# Patient Record
Sex: FEMALE | Race: OTHER | HISPANIC OR LATINO | Employment: STUDENT | URBAN - METROPOLITAN AREA
[De-identification: names, ages, dates, MRNs, and addresses within clinical notes are randomized per-mention and may not be internally consistent; named-entity substitution may affect disease eponyms.]

---
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
## 2024-07-01 NOTE — PROGRESS NOTES
Assessment   Diagnoses and all orders for this visit:    Possible exposure to STD- will call results  -     Hepatitis C antibody; Future  -     HIV 1/2 AG/AB w Reflex [facility] for 2 yr old and above; Future  -     RPR-Syphilis Screening (Total Syphilis IGG/IGM); Future  -     Hepatitis B surface antigen  -     Chlamydia/GC amplified DNA by PCR    Pregnancy test positive  -     POCT urine HCG  Pt aware of options  Information given on [facility] per request  Reviewed pregnancy precautions, call with bleeding, pain. Reviewed PN vitamins if keeping pregnancy, call with any concerns    Vaginal yeast infection  -     POCT wet mount-Positive for Hebrew        Possible STD exposure    Yeast infection, plans to use Monistat OTC    Plan     Will call pt with results  Pt to call and let us know what her plan is       Subjective    Juli Fish is a 18 y.o. female who presents for sexually transmitted disease check.she is accompanied by her mother today.  Sexual history reviewed with the patient. STI Exposure: denies knowledge of risky exposure.  She reports she just wants to be checked and desires STD blood work and culture done.  Previous history of STI chlamydia.  Last culture for chlamydia and gonorrhea was done 2022 and was negative history of positive chlamydia 2022 ,  current symptoms thick dsch that is new  LMP 24, menses is late, nauseous x 2 week.  She reports her menses are never late.  She has not been using any contraception, previously was on Depo-Provera  Contraception: none  Menstrual History:  OB History          0    Para   0    Term   0       0    AB   0    Living   0         SAB   0    IAB   0    Ectopic   0    Multiple   0    Live Births   0                  Patient's last menstrual period was 2024 (exact date).       The following portions of the patient's history were reviewed and updated as appropriate: allergies, current medications, past family history, past medical  "history, past social history, past surgical history, and problem list.    Review of Systems  Pertinent items are noted in HPI.        Objective      /74 (BP Location: Right arm, Patient Position: Sitting, Cuff Size: Adult)   Pulse 86   Resp 18   Ht 5' 3\" (1.6 m)   Wt 48.5 kg (107 lb)   LMP 05/30/2024 (Exact Date)   BMI 18.95 kg/m²     General:   alert and oriented, in no acute distress   Heart: regular rate and rhythm   Lungs:    Abdomen: soft, non-tender, without masses or organomegaly   Vulva: Bartholin's, Urethra, Briggs's normal   Vagina: normal discharge   Cervix: no cervical motion tenderness and no lesions   Uterus: normal size, non-tender   Adnexa: normal adnexa and no mass, fullness, tenderness   Lymph Nodes:  Cervical, supraclavicular, and axillary nodes normal.   Cultures: GC and Chlamydia genprobes             "

## 2024-07-02 ENCOUNTER — OFFICE VISIT (OUTPATIENT)
Dept: OBGYN CLINIC | Facility: CLINIC | Age: 19
End: 2024-07-02

## 2024-07-02 VITALS
HEART RATE: 86 BPM | DIASTOLIC BLOOD PRESSURE: 74 MMHG | BODY MASS INDEX: 18.96 KG/M2 | RESPIRATION RATE: 18 BRPM | SYSTOLIC BLOOD PRESSURE: 121 MMHG | HEIGHT: 63 IN | WEIGHT: 107 LBS

## 2024-07-02 DIAGNOSIS — Z32.01 PREGNANCY TEST POSITIVE: ICD-10-CM

## 2024-07-02 DIAGNOSIS — Z20.2 POSSIBLE EXPOSURE TO STD: Primary | ICD-10-CM

## 2024-07-02 DIAGNOSIS — B37.31 VAGINAL YEAST INFECTION: ICD-10-CM

## 2024-07-02 LAB
BV WHIFF TEST VAG QL: ABNORMAL
CLUE CELLS SPEC QL WET PREP: ABNORMAL
PH SMN: 4 [PH]
SL AMB POCT URINE HCG: NORMAL
SL AMB POCT WET MOUNT: ABNORMAL
T VAGINALIS VAG QL WET PREP: ABNORMAL
YEAST VAG QL WET PREP: ABNORMAL

## 2024-07-02 PROCEDURE — 87591 N.GONORRHOEAE DNA AMP PROB: CPT | Performed by: NURSE PRACTITIONER

## 2024-07-02 PROCEDURE — 99213 OFFICE O/P EST LOW 20 MIN: CPT | Performed by: NURSE PRACTITIONER

## 2024-07-02 PROCEDURE — 87210 SMEAR WET MOUNT SALINE/INK: CPT | Performed by: NURSE PRACTITIONER

## 2024-07-02 PROCEDURE — 81025 URINE PREGNANCY TEST: CPT | Performed by: NURSE PRACTITIONER

## 2024-07-02 PROCEDURE — 87491 CHLMYD TRACH DNA AMP PROBE: CPT | Performed by: NURSE PRACTITIONER

## 2024-07-05 LAB
C TRACH DNA SPEC QL NAA+PROBE: NEGATIVE
N GONORRHOEA DNA SPEC QL NAA+PROBE: NEGATIVE

## 2024-07-08 ENCOUNTER — OFFICE VISIT (OUTPATIENT)
Dept: FAMILY MEDICINE CLINIC | Facility: CLINIC | Age: 19
End: 2024-07-08

## 2024-07-08 VITALS
WEIGHT: 108.6 LBS | TEMPERATURE: 99.2 F | HEIGHT: 63 IN | BODY MASS INDEX: 19.24 KG/M2 | DIASTOLIC BLOOD PRESSURE: 62 MMHG | SYSTOLIC BLOOD PRESSURE: 100 MMHG

## 2024-07-08 DIAGNOSIS — Z32.01 PREGNANCY TEST POSITIVE: Primary | ICD-10-CM

## 2024-07-08 DIAGNOSIS — F32.9 MAJOR DEPRESSIVE DISORDER, REMISSION STATUS UNSPECIFIED, UNSPECIFIED WHETHER RECURRENT: ICD-10-CM

## 2024-07-08 RX ORDER — ESCITALOPRAM OXALATE 10 MG/1
10 TABLET ORAL DAILY
Qty: 30 TABLET | Refills: 0 | Status: SHIPPED | OUTPATIENT
Start: 2024-07-08

## 2024-07-08 NOTE — PROGRESS NOTES
Ambulatory Visit  Name: Juli Fish      : 2005      MRN: [id number]  Encounter Provider: Earlene Leong MD  Encounter Date: 2024   Encounter department: [facility]    Assessment & Plan   1. Pregnancy test positive  Assessment & Plan:  POCT pregnancy positive  LMP unknown exact date; patient reported last week of May  Likely to be roughly 5-6 weeks gestation  Unwanted pregnancy; Options were discussed with patient  Patient has been in contact with [facility]; she would like a form about elective termination to be filled out by PCP, however, did not bring form    Advised patient to send form to our office  Will review form once received and sign if deemed appropriat    2. Major depressive disorder, remission status unspecified, unspecified whether recurrent  Assessment & Plan:  History of MDD in remission and suicidal ideation with attempt in May 2021  Has never been on medication or treatment  Has not been seen by psychiatry or any other behavioral professional due to wait list  Mood has been more anxious and sad for the past week, especially since discovering she is pregnant  PHQ-9 score was 11 today.   No SI, HI today    Discussed behavioral support and resources  Will trial patient on Lexapro 10 mg; recommended to start on 5 mg for 1-2 weeks to attempt to diminish GI side effects with goal to transition to 10 mg forward  Discussed expectations of SSRI effectiveness in 4-6 weeks after starting medication  Return to office in 4-6 weeks to assess symptoms      Orders:  -     escitalopram (Lexapro) 10 mg tablet; Take 1 tablet (10 mg total) by mouth daily       History of Present Illness     17 yo female presents today to the office confirm pregnancy. Patient was seen earlier this month at [facility] where POCT pregnancy was positive.  Patient reached out to [facility] to discuss options.  Patient has been having morning sickness for the past  "month.  She reports that she has been feeling somewhat \"weird\" since learning about her pregnancy.  She feels anxious as well as sad.  Patient reports that she is not ready to be pregnant.  She also reports that she discussed termination options with [facility].  She states that they recommended patient to bring form to PCP to fill out termination papers.  Patient denies SI, HI, vaginal bleeding, abdominal pain.        Review of Systems   Constitutional:  Negative for fatigue.   Respiratory:  Negative for shortness of breath.    Cardiovascular:  Negative for chest pain.   Gastrointestinal:  Positive for nausea and vomiting. Negative for constipation and diarrhea.   Genitourinary:  Negative for menstrual problem and pelvic pain.   Psychiatric/Behavioral:  Negative for behavioral problems, confusion, decreased concentration, dysphoric mood, hallucinations, self-injury and suicidal ideas. The patient is nervous/anxious. The patient is not hyperactive.        Objective     /62 (BP Location: Left arm, Patient Position: Sitting, Cuff Size: Standard)   Temp 99.2 °F (37.3 °C) (Tympanic)   Ht 5' 3\" (1.6 m)   Wt 49.3 kg (108 lb 9.6 oz)   LMP 05/30/2024 (Exact Date)   BMI 19.24 kg/m²     Physical Exam  Constitutional:       Appearance: Normal appearance.   Cardiovascular:      Rate and Rhythm: Normal rate and regular rhythm.      Pulses: Normal pulses.      Heart sounds: Normal heart sounds.   Pulmonary:      Effort: Pulmonary effort is normal.      Breath sounds: Normal breath sounds.   Abdominal:      Palpations: Abdomen is soft.   Neurological:      Mental Status: She is alert.   Psychiatric:         Attention and Perception: Attention normal.         Mood and Affect: Mood and affect normal.         Speech: Speech normal.         Behavior: Behavior normal. Behavior is cooperative.         Thought Content: Thought content normal.         Cognition and Memory: Cognition normal.         Judgment: Judgment normal. "       Administrative Statements

## 2024-07-09 LAB — SL AMB POCT URINE HCG: POSITIVE

## 2024-07-09 NOTE — ASSESSMENT & PLAN NOTE
History of MDD in remission and suicidal ideation with attempt in May 2021  Has never been on medication or treatment  Has not been seen by psychiatry or any other behavioral professional due to wait list  Mood has been more anxious and sad for the past week, especially since discovering she is pregnant  PHQ-9 score was 11 today.   No SI, HI today    Discussed behavioral support and resources  Will trial patient on Lexapro 10 mg; recommended to start on 5 mg for 1-2 weeks to attempt to diminish GI side effects with goal to transition to 10 mg forward  Discussed expectations of SSRI effectiveness in 4-6 weeks after starting medication  Return to office in 4-6 weeks to assess symptoms

## 2024-07-09 NOTE — ASSESSMENT & PLAN NOTE
POCT pregnancy positive  LMP unknown exact date; patient reported last week of May  Likely to be roughly 5-6 weeks gestation  Unwanted pregnancy; Options were discussed with patient  Patient has been in contact with [facility]; she would like a form about elective termination to be filled out by PCP, however, did not bring form    Advised patient to send form to our office  Will review form once received and sign if deemed appropriat

## 2024-07-10 ENCOUNTER — TELEPHONE (OUTPATIENT)
Dept: OBGYN CLINIC | Facility: CLINIC | Age: 19
End: 2024-07-10

## 2024-07-10 NOTE — TELEPHONE ENCOUNTER
----- Message from OLGA Ariza sent at 7/10/2024  9:40 AM EDT -----  Please inform pt that her culture for chlamydia and gonorrhea were negative.  Thank You!

## 2024-07-11 NOTE — TELEPHONE ENCOUNTER
Attempted to call, left a message referring patient to my chart to review recent test results. Office number provided in message in case of questions or concerns

## 2024-07-24 ENCOUNTER — TELEPHONE (OUTPATIENT)
Dept: FAMILY MEDICINE CLINIC | Facility: CLINIC | Age: 19
End: 2024-07-24

## 2024-07-24 NOTE — TELEPHONE ENCOUNTER
Encounter for forms      Patient requires a form to be completed.  Patient is aware of 7-10 day turn around time.    Please refer to the following information:       Type of Form:  Physicians Certification for  an      Date of Visit (if applicable):     Doctor: Lolly     Expected date:asap     How patient would like to receive form: Picked up     Fax number:     Patient phone number:       Copy scanned to encounter. Copy provided to patient. Original in (X) team folder to be completed.

## 2024-08-12 ENCOUNTER — TELEPHONE (OUTPATIENT)
Age: 19
End: 2024-08-12

## 2024-08-12 ENCOUNTER — OFFICE VISIT (OUTPATIENT)
Dept: FAMILY MEDICINE CLINIC | Facility: CLINIC | Age: 19
End: 2024-08-12

## 2024-08-12 VITALS
TEMPERATURE: 98.7 F | OXYGEN SATURATION: 94 % | DIASTOLIC BLOOD PRESSURE: 57 MMHG | WEIGHT: 107 LBS | SYSTOLIC BLOOD PRESSURE: 130 MMHG | HEART RATE: 103 BPM | BODY MASS INDEX: 18.95 KG/M2

## 2024-08-12 DIAGNOSIS — Z3A.15 15 WEEKS GESTATION OF PREGNANCY: Primary | ICD-10-CM

## 2024-08-12 RX ORDER — PNV NO.95/FERROUS FUM/FOLIC AC 28MG-0.8MG
1 TABLET ORAL DAILY
Qty: 90 TABLET | Refills: 1 | Status: SHIPPED | OUTPATIENT
Start: 2024-08-12

## 2024-08-12 NOTE — TELEPHONE ENCOUNTER
Mariana from [facility] called looking to schedule an appt for pt. Referral was placed by office for what appears to be Nuchal, however, pt is currently 15 wks and 2 days preg (LMP 4/27/2024). Mariana confirmed pt has not yet had a D&V US, and could not confirm if there are 1 or multiple fetuses.    Did communicate plan to reach out to office to ensure scheduling correctly, as I believe this would need to go through Dr. Neri prior to Scheduling.    Please then reach out to pt to assist with scheduling. Thank you.

## 2024-08-13 ENCOUNTER — APPOINTMENT (OUTPATIENT)
Dept: LAB | Facility: CLINIC | Age: 19
End: 2024-08-13
Payer: COMMERCIAL

## 2024-08-13 DIAGNOSIS — Z20.2 POSSIBLE EXPOSURE TO STD: ICD-10-CM

## 2024-08-13 DIAGNOSIS — Z3A.15 15 WEEKS GESTATION OF PREGNANCY: ICD-10-CM

## 2024-08-13 LAB
ABO GROUP BLD: NORMAL
BACTERIA UR QL AUTO: ABNORMAL /HPF
BASOPHILS # BLD AUTO: 0.04 THOUSANDS/ÂΜL (ref 0–0.1)
BASOPHILS NFR BLD AUTO: 1 % (ref 0–1)
BILIRUB UR QL STRIP: NEGATIVE
BLD GP AB SCN SERPL QL: NEGATIVE
CLARITY UR: ABNORMAL
COLOR UR: YELLOW
EOSINOPHIL # BLD AUTO: 0.1 THOUSAND/ÂΜL (ref 0–0.61)
EOSINOPHIL NFR BLD AUTO: 2 % (ref 0–6)
ERYTHROCYTE [DISTWIDTH] IN BLOOD BY AUTOMATED COUNT: 13.2 % (ref 11.6–15.1)
GLUCOSE UR STRIP-MCNC: NEGATIVE MG/DL
HCT VFR BLD AUTO: 34.8 % (ref 34.8–46.1)
HGB BLD-MCNC: 11.5 G/DL (ref 11.5–15.4)
HGB UR QL STRIP.AUTO: NEGATIVE
IMM GRANULOCYTES # BLD AUTO: 0.1 THOUSAND/UL (ref 0–0.2)
IMM GRANULOCYTES NFR BLD AUTO: 2 % (ref 0–2)
KETONES UR STRIP-MCNC: NEGATIVE MG/DL
LEUKOCYTE ESTERASE UR QL STRIP: NEGATIVE
LYMPHOCYTES # BLD AUTO: 1.39 THOUSANDS/ÂΜL (ref 0.6–4.47)
LYMPHOCYTES NFR BLD AUTO: 22 % (ref 14–44)
MCH RBC QN AUTO: 28.2 PG (ref 26.8–34.3)
MCHC RBC AUTO-ENTMCNC: 33 G/DL (ref 31.4–37.4)
MCV RBC AUTO: 85 FL (ref 82–98)
MONOCYTES # BLD AUTO: 0.53 THOUSAND/ÂΜL (ref 0.17–1.22)
MONOCYTES NFR BLD AUTO: 8 % (ref 4–12)
MUCOUS THREADS UR QL AUTO: ABNORMAL
NEUTROPHILS # BLD AUTO: 4.12 THOUSANDS/ÂΜL (ref 1.85–7.62)
NEUTS SEG NFR BLD AUTO: 65 % (ref 43–75)
NITRITE UR QL STRIP: NEGATIVE
NON-SQ EPI CELLS URNS QL MICRO: ABNORMAL /HPF
NRBC BLD AUTO-RTO: 0 /100 WBCS
PH UR STRIP.AUTO: 6.5 [PH]
PLATELET # BLD AUTO: 186 THOUSANDS/UL (ref 149–390)
PMV BLD AUTO: 11.5 FL (ref 8.9–12.7)
PROT UR STRIP-MCNC: ABNORMAL MG/DL
RBC # BLD AUTO: 4.08 MILLION/UL (ref 3.81–5.12)
RBC #/AREA URNS AUTO: ABNORMAL /HPF
RH BLD: POSITIVE
RUBV IGG SERPL IA-ACNC: 29.3 IU/ML
SP GR UR STRIP.AUTO: 1.02 (ref 1–1.03)
SPECIMEN EXPIRATION DATE: NORMAL
UROBILINOGEN UR STRIP-ACNC: <2 MG/DL
WBC # BLD AUTO: 6.28 THOUSAND/UL (ref 4.31–10.16)
WBC #/AREA URNS AUTO: ABNORMAL /HPF

## 2024-08-13 PROCEDURE — 86780 TREPONEMA PALLIDUM: CPT

## 2024-08-13 PROCEDURE — 86900 BLOOD TYPING SEROLOGIC ABO: CPT

## 2024-08-13 PROCEDURE — 36415 COLL VENOUS BLD VENIPUNCTURE: CPT

## 2024-08-13 PROCEDURE — 86803 HEPATITIS C AB TEST: CPT

## 2024-08-13 PROCEDURE — 87086 URINE CULTURE/COLONY COUNT: CPT

## 2024-08-13 PROCEDURE — 87389 HIV-1 AG W/HIV-1&-2 AB AG IA: CPT

## 2024-08-13 PROCEDURE — 86901 BLOOD TYPING SEROLOGIC RH(D): CPT

## 2024-08-13 PROCEDURE — 86706 HEP B SURFACE ANTIBODY: CPT

## 2024-08-13 PROCEDURE — 85025 COMPLETE CBC W/AUTO DIFF WBC: CPT

## 2024-08-13 PROCEDURE — 86762 RUBELLA ANTIBODY: CPT

## 2024-08-13 PROCEDURE — 86850 RBC ANTIBODY SCREEN: CPT

## 2024-08-13 PROCEDURE — 81001 URINALYSIS AUTO W/SCOPE: CPT

## 2024-08-13 NOTE — PROGRESS NOTES
Ambulatory Visit  Name: Juli Fish      : 2005      MRN: [id number]  Encounter Provider: Sanna Ambrocio DO  Encounter Date: 2024   Encounter department: [facility]    Assessment & Plan   1. 15 weeks gestation of pregnancy  Assessment & Plan:  Patient presenting to the office to set up prenatal care    - 15 weeks 2 days gestation estimated by LMP  - Patient reports feeling overall no vaginal discharge, vaginal bleeding, abdominal pain.  Reports bloating and some low back pain.    Plan:  Initial prenatal labs ordered  Referral to MFM for dating ultrasound and counseling on genetic testing  Start prenatal vitamin  Provided counseling and informational handout appropriate for gestational age  Plan to follow-up for formal prenatal visit  -Office attempted to schedule MFM appointment today but was unable to schedule at this time.  MF practice to reach out to patient and/or office to confirm appointment once scheduled    Orders:  -     Prenatal Vit-Fe Fumarate-FA (prenatal vitamin) 28-0.8 mg; Take 1 tablet by mouth in the morning  -     Ambulatory Referral to Maternal Fetal Medicine; Future; Expected date: 2024  -     HIV 1/2 AG/AB w Reflex [facility] for 2 yr old and above; Future  -     Hepatitis C antibody; Future  -     UA (URINE) with reflex to Scope; Future  -     Urine culture; Future  -     Hepatitis B surface antigen; Future  -     CBC and differential; Future  -     Rubella antibody, IgG; Future  -     Type and screen; Future  -     RPR-Syphilis Screening (Total Syphilis IGG/IGM); Future  -     Hepatitis B surface antibody; Future  -     Anemia Panel w/Reflex, OB; Future       History of Present Illness     HPI  18-year-old  female presenting to the office to establish prenatal care.  LMP on 2024.  Beta-hCG positive on 2024.  Patient expresses that she was initially uncertain about how to proceed with pregnancy, but presents to the office  today to establish prenatal care.  Support system includes her significant other, and her mother. She currently takes no medications, but was prescribed Lexapro recently which she trialed for 2 days.  She felt as though the medication was not necessary stopped taking it.  She has not been taking any vitamins or supplements.  She does report that uses marijuana, but has been cutting back finding out she was pregnant.  She notes occasional nausea, which is improving.  She denies any recent vomiting, diarrhea, or constipation.  No dietary restrictions noted.  She just denies any chest pain, palpitations, or shortness of breath.  Also denies any burning with urination, blood in her urine, or urinary frequency.      Review of Systems   Constitutional:  Positive for appetite change (increased). Negative for activity change, fatigue and fever.   Respiratory:  Negative for chest tightness and shortness of breath.    Cardiovascular:  Negative for chest pain and leg swelling.   Gastrointestinal:  Positive for abdominal distention and nausea. Negative for abdominal pain, constipation, diarrhea and vomiting.   Genitourinary:  Negative for dysuria, frequency, hematuria, vaginal bleeding, vaginal discharge and vaginal pain.   Musculoskeletal:  Positive for back pain.   Neurological:  Negative for dizziness, syncope and headaches.     Medical History Reviewed by provider this encounter:  Tobacco  Allergies  Meds  Problems  Med Hx  Surg Hx  Fam Hx       Objective     /57 (BP Location: Left arm, Patient Position: Sitting, Cuff Size: Standard)   Pulse 103   Temp 98.7 °F (37.1 °C)   Wt 48.5 kg (107 lb)   SpO2 94%   BMI 18.95 kg/m²     Physical Exam  Vitals reviewed.   Constitutional:       General: She is not in acute distress.  HENT:      Head: Normocephalic and atraumatic.      Mouth/Throat:      Mouth: Mucous membranes are moist.      Pharynx: Oropharynx is clear.   Eyes:      Conjunctiva/sclera: Conjunctivae  normal.   Cardiovascular:      Rate and Rhythm: Normal rate and regular rhythm.      Pulses: Normal pulses.      Heart sounds: Normal heart sounds. No murmur heard.  Pulmonary:      Effort: Pulmonary effort is normal. No respiratory distress.      Breath sounds: Normal breath sounds. No wheezing or rhonchi.   Abdominal:      General: Bowel sounds are normal. There is no distension.      Palpations: Abdomen is soft.      Tenderness: There is no abdominal tenderness. There is no guarding or rebound.   Musculoskeletal:      Right lower leg: No edema.      Left lower leg: No edema.   Skin:     General: Skin is warm and dry.      Capillary Refill: Capillary refill takes less than 2 seconds.   Neurological:      Mental Status: She is alert.   Psychiatric:         Mood and Affect: Mood normal.         Behavior: Behavior normal.

## 2024-08-13 NOTE — ASSESSMENT & PLAN NOTE
Patient presenting to the office to set up prenatal care    - 15 weeks 2 days gestation estimated by LMP  - Patient reports feeling overall no vaginal discharge, vaginal bleeding, abdominal pain.  Reports bloating and some low back pain.    Plan:  Initial prenatal labs ordered  Referral to MFM for dating ultrasound and counseling on genetic testing  Start prenatal vitamin  Provided counseling and informational handout appropriate for gestational age  Plan to follow-up for formal prenatal visit  -Office attempted to schedule MFM appointment today but was unable to schedule at this time.  MFM practice to reach out to patient and/or office to confirm appointment once scheduled

## 2024-08-14 LAB
HBV SURFACE AB SER-ACNC: 27 MIU/ML
HBV SURFACE AG SER QL: NORMAL
HCV AB SER QL: NORMAL
HIV 1+2 AB+HIV1 P24 AG SERPL QL IA: NORMAL
HIV 2 AB SERPL QL IA: NORMAL
HIV1 AB SERPL QL IA: NORMAL
HIV1 P24 AG SERPL QL IA: NORMAL
TREPONEMA PALLIDUM IGG+IGM AB [PRESENCE] IN SERUM OR PLASMA BY IMMUNOASSAY: NORMAL

## 2024-08-15 LAB — BACTERIA UR CULT: NORMAL

## 2024-08-19 ENCOUNTER — TELEPHONE (OUTPATIENT)
Dept: OTHER | Facility: HOSPITAL | Age: 19
End: 2024-08-19

## 2024-08-19 ENCOUNTER — TELEPHONE (OUTPATIENT)
Dept: OBGYN CLINIC | Facility: CLINIC | Age: 19
End: 2024-08-19

## 2024-08-19 DIAGNOSIS — R82.71 BACTERIA IN URINE: Primary | ICD-10-CM

## 2024-08-19 RX ORDER — CEPHALEXIN 500 MG/1
500 CAPSULE ORAL EVERY 12 HOURS SCHEDULED
Qty: 10 CAPSULE | Refills: 0 | Status: SHIPPED | OUTPATIENT
Start: 2024-08-19 | End: 2024-08-24

## 2024-08-19 NOTE — TELEPHONE ENCOUNTER
----- Message from OLGA Ariza sent at 8/16/2024  1:31 PM EDT -----  Please inform pt that her tests for RPR, Hep B sag, Hep C and HIV were negative.  Thanks

## 2024-08-19 NOTE — TELEPHONE ENCOUNTER
"Called patient to discuss lab results, primarily UA. The UA did not appear to be a truly clean catch and was showing possible contamination vs UTI. A 5-day course of keflex was sent to the patients pharmacy.     Patient was not available at the time of the call and \"call could not be completed\"  "

## 2024-08-19 NOTE — TELEPHONE ENCOUNTER
Called and spoke to patient, informed her of the test results. Patient verbalized understanding, no questions or concerns,.

## 2024-08-22 ENCOUNTER — ROUTINE PRENATAL (OUTPATIENT)
Age: 19
End: 2024-08-22
Payer: COMMERCIAL

## 2024-08-22 VITALS
HEIGHT: 63 IN | WEIGHT: 110.8 LBS | HEART RATE: 96 BPM | DIASTOLIC BLOOD PRESSURE: 60 MMHG | SYSTOLIC BLOOD PRESSURE: 114 MMHG | BODY MASS INDEX: 19.63 KG/M2

## 2024-08-22 DIAGNOSIS — Z3A.15 15 WEEKS GESTATION OF PREGNANCY: ICD-10-CM

## 2024-08-22 DIAGNOSIS — Z36.87 ENCOUNTER FOR ANTENATAL SCREENING FOR UNCERTAIN DATES: ICD-10-CM

## 2024-08-22 DIAGNOSIS — Z36.89 ENCOUNTER FOR ULTRASOUND TO CHECK FETAL GROWTH: ICD-10-CM

## 2024-08-22 DIAGNOSIS — Z36.3 ENCOUNTER FOR ANTENATAL SCREENING FOR MALFORMATION: ICD-10-CM

## 2024-08-22 DIAGNOSIS — Z36.0 ENCOUNTER FOR ANTENATAL SCREENING FOR CHROMOSOMAL ANOMALIES: Primary | ICD-10-CM

## 2024-08-22 DIAGNOSIS — Z33.1 PREGNANT STATE, INCIDENTAL: ICD-10-CM

## 2024-08-22 PROCEDURE — 76805 OB US >/= 14 WKS SNGL FETUS: CPT | Performed by: OBSTETRICS & GYNECOLOGY

## 2024-08-22 PROCEDURE — 36415 COLL VENOUS BLD VENIPUNCTURE: CPT | Performed by: OBSTETRICS & GYNECOLOGY

## 2024-08-22 PROCEDURE — 99243 OFF/OP CNSLTJ NEW/EST LOW 30: CPT | Performed by: OBSTETRICS & GYNECOLOGY

## 2024-08-22 NOTE — LETTER
"2024    Sanna Ambrocio, DO  [address]  [address]  [place] [address]    Patient: Juli Fish   YOB: 2005   Date of Visit: 2024   Gestational age 16w4d   Nature of this communication: Routine though please note patient does not have any future OB prenatal appoitnments scheduled at this point       Dear Dr Ambrocio,    This patient was seen recently in our  office.  The content of my evaluation today is in the ultrasound report under \"OB Procedures\" tab.     Please don't hesitate to contact our office with any concerns or questions.     Sincerely,      Peggy Black MD  Attending Physician, Maternal-Fetal Medicine  [facility]      "

## 2024-08-22 NOTE — PROGRESS NOTES
"[facility]: Juli Fish was seen today for anatomic survey ultrasound.  See ultrasound report under \"OB Procedures\" tab.   Please don't hesitate to contact our office with any concerns or questions.  -Peggy Black MD    "

## 2024-08-22 NOTE — PROGRESS NOTES
Patient chose to have [facility] [id number] Non-Invasive Prenatal Screen [id number] PLUS w/ SCA, WITH fetal sex (per pt request).  Patient choose to be billed through insurance.     Patient given brochure and is aware [facility] will contact patient's insurance and coordinate coverage.  Provided [facility] contact information. General inquiries [phone], Cost estimates [phone] and [facility] Billing [phone]. Website [email or web address].     Blood collection tubes labeled with patient identifiers (name, medical record number, and date of birth).     Filled out [facility] order form. Patient chose to have blood drawn in our office at time of visit. NIPS was drawn from left arm with a butterfly needle by A Mariano COTA. .      If patient chose to have blood work drawn at a [facility] lab we requested patient notify MFM (via phone call or [employer] message) when blood collected so office can follow up on results.       Maternal Fetal Medicine will have results in approximately 5-7 business days and will call patient or notify via [employer].  Patient aware viewing lab result online will reveal fetal sex if ordered.    Patient verbalized understanding of all instructions and no questions at this time.

## 2024-08-28 LAB
CFDNA.FET/CFDNA.TOTAL SFR FETUS: NORMAL %
CITATION REF LAB TEST: NORMAL
FET 13+18+21+X+Y ANEUP PLAS.CFDNA: NEGATIVE
FET CHR 21 TS PLAS.CFDNA QL: NEGATIVE
FET CHR 21 TS PLAS.CFDNA QL: NEGATIVE
FET MS X RISK WBC.DNA+CFDNA QL: NOT DETECTED
FET SEX PLAS.CFDNA DOSAGE CFDNA: NORMAL
FET TS 13 RISK PLAS.CFDNA QL: NEGATIVE
FET X + Y ANEUP RISK PLAS.CFDNA SEQ-IMP: NOT DETECTED
GA EST FROM CONCEPTION DATE: NORMAL D
GESTATIONAL AGE > 9:: YES
LAB DIRECTOR NAME PROVIDER: NORMAL
LAB DIRECTOR NAME PROVIDER: NORMAL
LABORATORY COMMENT REPORT: NORMAL
LIMITATIONS OF THE TEST: NORMAL
NEGATIVE PREDICTIVE VALUE: NORMAL
PERFORMANCE CHARACTERISTICS: NORMAL
POSITIVE PREDICTIVE VALUE: NORMAL
REF LAB TEST METHOD: NORMAL
SL AMB NOTE:: NORMAL
TEST PERFORMANCE INFO SPEC: NORMAL

## 2024-08-29 ENCOUNTER — TELEPHONE (OUTPATIENT)
Dept: FAMILY MEDICINE CLINIC | Facility: CLINIC | Age: 19
End: 2024-08-29

## 2024-08-29 NOTE — TELEPHONE ENCOUNTER
Left message for patient to call us to set up first prenatal visit should be with Dr Lolly LEMUS for 1 hour visit.

## 2024-09-04 ENCOUNTER — TELEPHONE (OUTPATIENT)
Dept: FAMILY MEDICINE CLINIC | Facility: CLINIC | Age: 19
End: 2024-09-04

## 2024-09-05 ENCOUNTER — PATIENT OUTREACH (OUTPATIENT)
Dept: OBGYN CLINIC | Facility: CLINIC | Age: 19
End: 2024-09-05

## 2024-09-05 ENCOUNTER — INITIAL PRENATAL (OUTPATIENT)
Dept: OBGYN CLINIC | Facility: CLINIC | Age: 19
End: 2024-09-05

## 2024-09-05 VITALS
WEIGHT: 107 LBS | DIASTOLIC BLOOD PRESSURE: 78 MMHG | BODY MASS INDEX: 18.96 KG/M2 | HEART RATE: 83 BPM | SYSTOLIC BLOOD PRESSURE: 113 MMHG | HEIGHT: 63 IN

## 2024-09-05 DIAGNOSIS — Z34.92 PRENATAL CARE IN SECOND TRIMESTER: Primary | ICD-10-CM

## 2024-09-05 DIAGNOSIS — Z31.430 ENCOUNTER OF FEMALE FOR TESTING FOR GENETIC DISEASE CARRIER STATUS FOR PROCREATIVE MANAGEMENT: ICD-10-CM

## 2024-09-05 DIAGNOSIS — Z3A.18 18 WEEKS GESTATION OF PREGNANCY: ICD-10-CM

## 2024-09-05 PROCEDURE — 99211 OFF/OP EST MAY X REQ PHY/QHP: CPT

## 2024-09-05 NOTE — PROGRESS NOTES
LELA KAY was referred by Dr. Sewell to complete a PN SW assessment in the second trimester. Pt is , she is 81d8fHG, her JESUS is 2025. Pt is english speaking and is here with her partner, Amirah.    Pt and amirah report the pregnancy is unplanned. PN care was delayed because when they first found out about they pregnancy they did debate terminating. Pt and partner are feeling excited to parent. Pt and partner report support from family in regards to pregnancy. Pt currently lives at home with her mother and sister, partner lives with his parents. Partner is looking to get his own place soon. Pt does not work, partner works in a warehouse. Pt plans to apply for WIC, she has MA. Pts family or partner drive. Pt has no concerns obtaining baby supplies.     Pt reports MH hx of one suicide attempt approx 2-3 years ago. Pt attempted to overdose on pills and was inpatient at [facility]. Pt reports having opt therapy after that for a small amount of time. Pt denies any current mental health treatment, diagnosis or concerns. Pt reports that the SA was due to issues in her relationship with current FOB. Pt and FOB were in a relationship at that time and then have had 1-2 years apart. Pt and partner recently rekindled their relationship in the last few months. Pt and partner report they feel the relationship is stable at this time, report family did have hesitancies when they intially got back together but have been more supportive now that they are expecting a baby.     Pt denies any CYS, legal, or D&A concerns, LELA KAY encouraged pt to outreach as needed, will plan to f/u in three months, LELA KAY highly encouraged NFP.

## 2024-09-05 NOTE — PATIENT INSTRUCTIONS
WARNING SIGNS DURING PREGNANCY  Call our office at [phone] for any of the followin. Vaginal bleeding  2. Sharp abdominal pain that does not go away  3. Fever (more than 100.4 and is not relieved by Tylenol)  4. Persistent vomiting lasting greater than 24 hours  5. Chest pain   6. Pain or burning when you urinate  7. Severe headache that doesn't resolve with Tylenol  8. Blurred vision or seeing spots in your vision  9. Sudden swelling of your face or hands  10. Redness, swelling or pain in a leg  11. A sudden weight gain in just a few days  12. Decrease in your baby's movement (after 28 weeks or the 6th month of pregnancy)  13. A loss of watery fluid from your vagina - can be a gush, a trickle or continuous wetness  14. After 20 weeks of pregnancy, rhythmic cramping (greater than 4 per hour) or menstrual like low/pelvic pain

## 2024-09-05 NOTE — PROGRESS NOTES
"OBSTETRIC INTAKE VISIT    Juli Fish presents today for initial OB visit and intake at 18w4d. LMP - 24.PT is transferring care from [facility]. History obtained from patient and she reports it as follows:    Past Medical History:   Diagnosis Date    Anxiety     Depression     saw a therapist before    Dizziness     Headache     HEADACHES    Lyme disease     Suicide attempt (HCC) 03/10/2021    Being seen by [facility]. Diagnosed with depression. Taking Alcakak 420mg?      History reviewed. No pertinent surgical history.  OB History    Para Term  AB Living   1 0 0 0 0 0   SAB IAB Ectopic Multiple Live Births   0 0 0 0 0      # Outcome Date GA Lbr Sameer/2nd Weight Sex Type Anes PTL Lv   1 Current              Social History     Tobacco Use    Smoking status: Never     Passive exposure: Current    Smokeless tobacco: Never   Vaping Use    Vaping status: Never Used   Substance Use Topics    Alcohol use: No    Drug use: Not Currently     Types: Marijuana     Comment: stopped around 23       Current Outpatient Medications   Medication Instructions    Prenatal Vit-Fe Fumarate-FA (prenatal vitamin) 28-0.8 mg 1 tablet, Oral, Daily       No Known Allergies    Vitals: /78 (BP Location: Left arm, Patient Position: Sitting, Cuff Size: Standard)   Pulse 83   Ht 5' 3\" (1.6 m)   Wt 48.5 kg (107 lb)   LMP 2024 (Approximate)   BMI 18.95 kg/m²     Review of Systems:  Denies vaginal bleeding or leaking fluid.  Denies abdominal/pelvic pain or contractions. FHTS 154.    Plan:  1. OB labwork completed.   2. Next ultrasound is scheduled for 24.  3. Return 24 for H&P prenatal visit.  4. Referrals placed for WIC, , and [facility].  5. Given vaccines: None due.  6. Patient's depression screening was assessed with a PHQ-2 score of 0. Their PHQ-9 score was 0. Clinically patient does not have depression. No treatment is required.   in to see " patient.  7. Reviewed the following educational topics with patient:   -routine prenatal visit/ultrasound/labwork schedule   -hospital for delivery and office phone/answering service contact information   -nutritional demands of pregnancy, healthy dietary habits   -listeria, toxoplasmosis, seafood precautions   -weight gain expectations (based on pre-pregnant BMI)   -exercise, rest, and sexual activity during pregnancy   -abstinence from alcohol, tobacco, and illegal drugs   -common discomforts of pregnancy and appropriate management   -OTC medications safe to use in pregnancy   -genetic screening options   -vaccines in pregnancy   -symptoms to report to OB provider    -signs of PTL and pre-eclampsia    -vaginal bleeding/leaking of fluid    -severe n/v-unable to tolerate ANY food/fluids for more than 24 hours

## 2024-09-05 NOTE — LETTER
9/5/2024      This letter is to confirm that Juli Fish is pregnant and is under our care.  Her Estimated Date of Delivery: 2/2/25.    If you have any questions or concerns, please contact our office.  Thank you,    OLGA Cruz

## 2024-09-05 NOTE — LETTER
"    [facility] REFERRAL      Date: 9/5/2024    Patient name: Juli Fish    YOB: 2005    Estimated Date of Delivery: 2/2/25      /78 (BP Location: Left arm, Patient Position: Sitting, Cuff Size: Standard)   Pulse 83   Ht 5' 3\" (1.6 m)   Wt 48.5 kg (107 lb)   LMP 04/28/2024 (Approximate)   BMI 18.95 kg/m²         Thank you,  OLGA Cruz            [facility] locations:   1. [facility]       [address] [place] [address]       Phone: [phone]       Fax: [phone]     2. [place] [facility]       [address] [address]       Phone: [phone]       Fax: [phone]       "

## 2024-09-16 ENCOUNTER — APPOINTMENT (OUTPATIENT)
Dept: LAB | Facility: HOSPITAL | Age: 19
End: 2024-09-16
Payer: COMMERCIAL

## 2024-09-16 ENCOUNTER — APPOINTMENT (OUTPATIENT)
Dept: LAB | Facility: CLINIC | Age: 19
End: 2024-09-16
Payer: COMMERCIAL

## 2024-09-16 DIAGNOSIS — Z31.430 ENCOUNTER OF FEMALE FOR TESTING FOR GENETIC DISEASE CARRIER STATUS FOR PROCREATIVE MANAGEMENT: ICD-10-CM

## 2024-09-16 DIAGNOSIS — Z3A.18 18 WEEKS GESTATION OF PREGNANCY: ICD-10-CM

## 2024-09-16 DIAGNOSIS — Z34.92 PRENATAL CARE IN SECOND TRIMESTER: ICD-10-CM

## 2024-09-16 PROCEDURE — 36415 COLL VENOUS BLD VENIPUNCTURE: CPT

## 2024-09-17 PROBLEM — B37.31 VAGINAL YEAST INFECTION: Status: RESOLVED | Noted: 2024-07-02 | Resolved: 2024-09-17

## 2024-09-17 PROBLEM — T14.91XA SUICIDE ATTEMPT (HCC): Status: RESOLVED | Noted: 2021-03-10 | Resolved: 2024-09-17

## 2024-09-17 PROBLEM — A74.9 CHLAMYDIA: Status: RESOLVED | Noted: 2022-12-20 | Resolved: 2024-09-17

## 2024-09-18 ENCOUNTER — DOCUMENTATION (OUTPATIENT)
Age: 19
End: 2024-09-18

## 2024-09-18 NOTE — PROGRESS NOTES
Patient showed up at 405 pm (for 330 pm appt). Spoke with Dr. Simms and rescheduled patient to Wednesday 9/25/2024 at 0830 am confirmed with patient that arrival time is 0815 am as time is needed for her U/S and TV appt. Patient verbalized understanding.

## 2024-09-20 LAB
CITATION REF LAB TEST: NORMAL
CLINICAL INFO: NORMAL
ETHNIC BACKGROUND STATED: NORMAL
GENE DIS ANL CARRIER INTERP-IMP: NORMAL
GENE MUT TESTED BLD/T: NORMAL
LAB DIRECTOR NAME PROVIDER: NORMAL
REASON FOR REFERRAL (NARRATIVE): NORMAL
RECOMMENDATION PATIENT DOC-IMP: NORMAL
REF LAB TEST METHOD: NORMAL
SERVICE CMNT-IMP: NORMAL
SMN1 GENE MUT ANL BLD/T: NORMAL
SPECIMEN SOURCE: NORMAL

## 2024-09-23 ENCOUNTER — INITIAL PRENATAL (OUTPATIENT)
Dept: OBGYN CLINIC | Facility: CLINIC | Age: 19
End: 2024-09-23

## 2024-09-23 VITALS
BODY MASS INDEX: 20.94 KG/M2 | WEIGHT: 118.2 LBS | SYSTOLIC BLOOD PRESSURE: 118 MMHG | HEIGHT: 63 IN | HEART RATE: 86 BPM | DIASTOLIC BLOOD PRESSURE: 75 MMHG

## 2024-09-23 DIAGNOSIS — Z34.92 PRENATAL CARE IN SECOND TRIMESTER: Primary | ICD-10-CM

## 2024-09-23 PROBLEM — Z3A.21 21 WEEKS GESTATION OF PREGNANCY: Status: ACTIVE | Noted: 2024-08-12

## 2024-09-23 PROCEDURE — 99213 OFFICE O/P EST LOW 20 MIN: CPT | Performed by: OBSTETRICS & GYNECOLOGY

## 2024-09-23 NOTE — PROGRESS NOTES
OB/GYN  PRENATAL H&P VISIT  Juli Fish  2024  3:49 PM  Dr. Kavita Sewell MD      SUBJECTIVE  Patient is a  at 21w1d here for initial prenatal H&P.     She is currently doing well. She is currently not working. She does have a history of depression and suicidal ideation but she is not currently on medications.    She reports a history of chlamydia in  that was treated. She denies a hx of TB or close contacts with persons with TB. She has not had MRSA.     She denies a family history of inheritable conditions such as physical or intellectual disabilities, birth defects, blood disorders, heart or neural tube defects.     She denies recent travel or travel planned in the near future.     She does have a history of marijuana use but reports discontinuation upon learning of pregnancy. She denies use of ETOH.    She denies vaginal bleeding, cramping, leakage, abnormal discharge.     OB History    Para Term  AB Living   1 0 0 0 0 0   SAB IAB Ectopic Multiple Live Births   0 0 0 0 0      # Outcome Date GA Lbr Sameer/2nd Weight Sex Type Anes PTL Lv   1 Current                Review of Systems   Constitutional:  Negative for chills and fever.   Respiratory:  Negative for cough, shortness of breath and wheezing.    Cardiovascular:  Negative for chest pain.   Gastrointestinal:  Negative for abdominal pain, nausea and vomiting.   Genitourinary:  Negative for dysuria, hematuria, urgency, vaginal bleeding and vaginal discharge.   Neurological:  Negative for dizziness, weakness, light-headedness and headaches.       Past Medical History:   Diagnosis Date    Anxiety     Chlamydia 2022    Depression     saw a therapist before    Dizziness     Headache     HEADACHES    Lyme disease     Suicide attempt (HCC) 03/10/2021    Being seen by [facility]. Diagnosed with depression. Taking Alcakak 420mg?        History reviewed. No pertinent surgical history.    Social History     Socioeconomic History     Marital status: Single     Spouse name: Not on file    Number of children: Not on file    Years of education: Not on file    Highest education level: Not on file   Occupational History    Not on file   Tobacco Use    Smoking status: Never     Passive exposure: Current    Smokeless tobacco: Never   Vaping Use    Vaping status: Never Used   Substance and Sexual Activity    Alcohol use: No    Drug use: Not Currently     Types: Marijuana     Comment: stopped around 8/22/24    Sexual activity: Yes     Partners: Male     Birth control/protection: None     Comment: [phone] is chava's personal cellular number   Other Topics Concern    Not on file   Social History Narrative    Patient gives permission to call mom's cellular number.     Social Determinants of Health     Financial Resource Strain: Low Risk  (9/23/2024)    Overall Financial Resource Strain (CARDIA)     Difficulty of Paying Living Expenses: Not hard at all   Food Insecurity: No Food Insecurity (9/23/2024)    Hunger Vital Sign     Worried About Running Out of Food in the Last Year: Never true     Ran Out of Food in the Last Year: Never true   Recent Concern: Food Insecurity - Food Insecurity Present (7/1/2024)    Hunger Vital Sign     Worried About Running Out of Food in the Last Year: Sometimes true     Ran Out of Food in the Last Year: Sometimes true   Transportation Needs: No Transportation Needs (9/23/2024)    PRAPARE - Transportation     Lack of Transportation (Medical): No     Lack of Transportation (Non-Medical): No   Recent Concern: Transportation Needs - Unmet Transportation Needs (7/1/2024)    PRAPARE - Transportation     Lack of Transportation (Medical): Yes     Lack of Transportation (Non-Medical): Yes   Physical Activity: Not on file   Stress: Not on file   Social Connections: Not on file   Intimate Partner Violence: Not on file   Housing Stability: Low Risk  (9/23/2024)    Housing Stability Vital Sign     Unable to Pay for Housing in the  "Last Year: No     Number of Times Moved in the Last Year: 0     Homeless in the Last Year: No       OBJECTIVE  Vitals:    24 1509   BP: 118/75   Pulse: 86     Physical Exam  Constitutional:       Appearance: She is well-developed.   Cardiovascular:      Rate and Rhythm: Normal rate and regular rhythm.      Heart sounds: Normal heart sounds. No murmur heard.     No friction rub. No gallop.   Pulmonary:      Effort: Pulmonary effort is normal. No respiratory distress.      Breath sounds: No wheezing.   Abdominal:      Palpations: Abdomen is soft.      Tenderness: There is no abdominal tenderness.   Musculoskeletal:         General: No tenderness.   Neurological:      Mental Status: She is alert and oriented to person, place, and time.   Vitals reviewed.         ASSESSMENT AND PLAN    19 y.o., , with /75 (BP Location: Left arm, Patient Position: Sitting)   Pulse 86   Ht 5' 3\" (1.6 m)   Wt 53.6 kg (118 lb 3.2 oz)   LMP 2024 (Approximate) , at 21w1d here for her prenatal H&P.    's by doppler    Problem List       Spinal curvature    Screening for HIV (human immunodeficiency virus)    Underweight    History of suicidal ideation    Major depressive disorder    Possible exposure to STD    21 weeks gestation of pregnancy       Pregnancy: H&P completed today. PN Labs reviewed today.  Labor expectations discussed with patient, including appointment schedule, nutrition, exercise, medications, sexual intercourse, and nausea/vomiting. Patient's BMI is 18. Recommended weight gain is 25-35lbs.     Screening: Pap smear not collected. GC/CT negative.Genetic screening reviewed with patient - screening was negative    Consents: Delivery process including potential OVD and  reviewed. Sign delivery consent form at 28 weeks.    Contraception: Different methods of contraception were discussed with patient, including progesterone only oral pills, depo provera, nexplanon, mirena, and paragard. " Patient would like to use Depo-Provera during postpartum phase.    Follow up: RTC in 4 weeks. Precautions regarding labor, leakage, bleeding, and fetal movement reviewed.    Kavita Sewell MD  9/23/2024  3:49 PM

## 2024-09-25 ENCOUNTER — ULTRASOUND (OUTPATIENT)
Age: 19
End: 2024-09-25
Payer: COMMERCIAL

## 2024-09-25 VITALS
HEIGHT: 63 IN | HEART RATE: 95 BPM | DIASTOLIC BLOOD PRESSURE: 80 MMHG | SYSTOLIC BLOOD PRESSURE: 120 MMHG | WEIGHT: 118.4 LBS | BODY MASS INDEX: 20.98 KG/M2

## 2024-09-25 DIAGNOSIS — Z3A.21 21 WEEKS GESTATION OF PREGNANCY: Primary | ICD-10-CM

## 2024-09-25 DIAGNOSIS — Z36.2 ENCOUNTER FOR FOLLOW-UP ULTRASOUND OF FETAL ANATOMY: ICD-10-CM

## 2024-09-25 DIAGNOSIS — Z36.89 ENCOUNTER FOR ULTRASOUND TO CHECK FETAL GROWTH: ICD-10-CM

## 2024-09-25 LAB
CITATION REF LAB TEST: NORMAL
CLINICAL INFO: NORMAL
ETHNIC BACKGROUND STATED: NORMAL
FMR1 GENE CGG RPT BLD/T QL: NORMAL
GENE DIS ANL CARRIER INTERP-IMP: NORMAL
INDICATION: NORMAL
LAB DIRECTOR NAME PROVIDER: NORMAL
RECOMMENDATION PATIENT DOC-IMP: NORMAL
REF LAB TEST METHOD: NORMAL
SERVICE CMNT-IMP: NORMAL
SPECIMEN SOURCE: NORMAL

## 2024-09-25 PROCEDURE — 76816 OB US FOLLOW-UP PER FETUS: CPT | Performed by: OBSTETRICS & GYNECOLOGY

## 2024-09-25 PROCEDURE — 76817 TRANSVAGINAL US OBSTETRIC: CPT | Performed by: OBSTETRICS & GYNECOLOGY

## 2024-09-25 PROCEDURE — 99212 OFFICE O/P EST SF 10 MIN: CPT | Performed by: OBSTETRICS & GYNECOLOGY

## 2024-09-25 NOTE — PROGRESS NOTES
"[facility]: Juli Fish was seen today for  growth, missed anatomy and cervical length .  See ultrasound report under \"OB Procedures\" tab.   Please don't hesitate to contact our office with any concerns or questions.  -Peggy Black MD      "

## 2024-09-25 NOTE — PROGRESS NOTES
Ultrasound Probe Disinfection    A transvaginal ultrasound was performed.   Prior to use, disinfection was performed with High Level Disinfection Process ([employer]).  Probe serial number S1: [id number] was used.    Chaperone: Cassie Bell RDMS

## 2024-10-10 PROBLEM — O09.892 CYSTIC FIBROSIS CARRIER IN SECOND TRIMESTER, ANTEPARTUM: Status: ACTIVE | Noted: 2024-10-10

## 2024-10-10 PROBLEM — Z14.1 CYSTIC FIBROSIS CARRIER IN SECOND TRIMESTER, ANTEPARTUM: Status: ACTIVE | Noted: 2024-10-10

## 2024-10-10 LAB
CFTR FULL MUT ANL BLD/T SEQ: ABNORMAL
CITATION REF LAB TEST: ABNORMAL
CLINICAL INFO: ABNORMAL
ETHNIC BACKGROUND STATED: ABNORMAL
GENE DIS ANL CARRIER INTERP-IMP: ABNORMAL
INDICATION: ABNORMAL
LAB DIRECTOR NAME PROVIDER: ABNORMAL
RECOMMENDATION PATIENT DOC-IMP: ABNORMAL
REF LAB TEST METHOD: ABNORMAL
SERVICE CMNT-IMP: ABNORMAL
SPECIMEN SOURCE: ABNORMAL

## 2024-10-21 ENCOUNTER — ROUTINE PRENATAL (OUTPATIENT)
Dept: OBGYN CLINIC | Facility: CLINIC | Age: 19
End: 2024-10-21

## 2024-10-21 VITALS
SYSTOLIC BLOOD PRESSURE: 119 MMHG | WEIGHT: 127 LBS | HEART RATE: 92 BPM | DIASTOLIC BLOOD PRESSURE: 75 MMHG | BODY MASS INDEX: 22.5 KG/M2

## 2024-10-21 DIAGNOSIS — Z3A.25 25 WEEKS GESTATION OF PREGNANCY: ICD-10-CM

## 2024-10-21 DIAGNOSIS — Z34.92 PRENATAL CARE IN SECOND TRIMESTER: ICD-10-CM

## 2024-10-21 DIAGNOSIS — Z23 FLU VACCINE NEED: Primary | ICD-10-CM

## 2024-10-21 PROCEDURE — 99214 OFFICE O/P EST MOD 30 MIN: CPT | Performed by: NURSE PRACTITIONER

## 2024-10-21 PROCEDURE — 90471 IMMUNIZATION ADMIN: CPT | Performed by: NURSE PRACTITIONER

## 2024-10-21 PROCEDURE — 90656 IIV3 VACC NO PRSV 0.5 ML IM: CPT | Performed by: NURSE PRACTITIONER

## 2024-10-21 NOTE — PROGRESS NOTES
Assessment & Plan  19 y.o.  at 25w1d presenting for routine prenatal visit.   Advised to complete 28 week labs prior to next visit    Problem List Items Addressed This Visit          Obstetrics/Gynecology    25 weeks gestation of pregnancy    Relevant Orders    Type and screen    Glucose, 1H PG    RPR-Syphilis Screening (Total Syphilis IGG/IGM)    CBC and differential     Other Visit Diagnoses       Flu vaccine need    -  Primary    Relevant Orders    influenza vaccine preservative-free 0.5 mL IM (Fluzone, Afluria, Fluarix, Flulaval) (Completed)    Prenatal care in second trimester        Relevant Orders    Type and screen    Glucose, 1H PG    RPR-Syphilis Screening (Total Syphilis IGG/IGM)    CBC and differential          ____________________________________________________________  Subjective  She is without complaint.   She denies contractions, loss of fluid, or vaginal bleeding.   She feels regular fetal movements.     WNL respiratory effort, negative cough or SOB    Objective  /75 (BP Location: Left arm, Patient Position: Sitting, Cuff Size: Standard)   Pulse 92   Wt 57.6 kg (127 lb)   LMP 2024 (Approximate)   BMI 22.50 kg/m²          Patient's Active Problem List  Patient Active Problem List   Diagnosis    Spinal curvature    Screening for HIV (human immunodeficiency virus)    Underweight    History of suicidal ideation    Major depressive disorder    Possible exposure to STD    25 weeks gestation of pregnancy    Cystic 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, antepartum     Plan  To call with vaginal bleeding, loss of fluid, regular contractions, decreased fetal movement, other needs or concerns.  Return in 4 weeks  Pt verbalized understanding of all discussed.

## 2024-10-21 NOTE — PATIENT INSTRUCTIONS
The Third Trimester  (28-42 weeks)  YOUR BABY   * your baby sucks its thumb now!   * your baby can hear voices and respond to touch…..so talk to him or her!!   * your baby’s brain grows and develops most in the last 2 months of pregnancy   * baby’s head and bones are soft and flexible so they can fit through the birth canal   * baby’s movements change towards the end of pregnancy because there is less room for kicking and stretching in your belly   * baby’s lungs are not fully developed and completely ready to breathe on their own until the last 3-4 weeks before your due date    YOUR BODY   * your belly is growing a lot now   * it may become more difficult to sleep well at night or to be as active as you usually are   * you may sweat more than usual   * you will become more off-balance……be careful not to fall!!   * you may develop hemorrhoids (which can be painful and make it difficult to sit down)   * the last two months of pregnancy can become very uncomfortable……with backaches, headaches, and heartburn   * you can start to have contractions…….as long as they are irregular and less than 5 per hour, this is a normal part of your body getting ready to have a baby   * your cervix may start to thin out and open up……to get ready for delivery   * you may find yourself needing to “pee” very often…….because baby is pressing on your bladder so much   * you may get out of breathe more quickly than usual      FETAL KICK COUNTS    In the third trimester (after 28 weeks gestation) you should be performing fetal kick counts every day.  Your baby should move at least 10 times in 2 hours during an active time, once a day.    Choose atime of day when your baby is most active.  Try to do this around the same time each day.  Get into a comfortable position and then write down the time your baby first moves.  Count each movement until the baby moves 10 times.  These movements include kicks, punches, nudges, flutters, or rolls.  This  can take anywhere from 5 minutes to 2 hours.  Write down the time you feel the baby's 10th movement.    If 2 hours has passed and your baby has not moved at least 10 times, you should CALL THE OFFICE RIGHT AWAY.  [phone].          PREMATURE LABOR     When to call [phone]:  * I need to call immediately if I have even a small amount of LIQUID leaking from my vagina, with or without contractions.   * I need to call if I am BLEEDING from my vagina.   * I need to call if I am feeling CRAMPING that continues after drinking 2-3 glasses of water and lying down on my side for one hour and that feels like I am having a period.   * I need to call if I feel CONTRACTIONS  more than 4 times in an hour that feels like the baby is “balling up” even after I try drinking 2-3 glasses of water and lying down on my side for an hour.   * I need to call if I notice a change in my vaginal DISCHARGE.   * I need to call if I am feeling PELVIC PRESSURE  that feels like the baby is pushing down into my vagina and lasts more than an hour.   * I need to call if I have LOW BACKACHE which is new and near my tailbone.  It may either come and go several times during an hour or stay there constantly.          PRE-ECLAMPSIA     What is it?   Pre-eclampsia is a serious disease that can occur during pregnancy related to high blood pressures.  It can happen to any woman.     Why should I care?   Women who develop pre-eclampsia have serious risks which can include seizures, stroke, organ damage, premature birth of their baby.  In the very worst cases, it can cause death of the mother and/or their baby.     What should I pay attention to?   Signs and symptoms of pre-eclampsia can include:   * Severe swelling of face or hands    * A headache that will not go away even after you have taken Tylenol   * Seeing spots or changes in eyesight    * Pain in the upper abdomen or shoulder    * New nausea and vomiting (in the second half of pregnancy)    *  Sudden weight gain    * Difficulty breathing     What should I do?   If you experience any of the above symptoms of pre-eclampsia, contact your OB provider.  Finding pre-eclampsia early is important for you and your baby.  Call us at [phone]..      BREASTFEEDING     BENEFITS FOR BABIES   * stronger immune systems (less allergies, eczema, asthma, and childhood cancers)   * less diarrhea and constipation or other GI diseases   * fewer colds and ear infections   * better vision and teeth (fewer cavities)   * improves IQ   * lower rates of diabetes and obesity in childhood     BENEFITS FOR MOMS   * promotes faster weight loss after delivery   * lower risk for postpartum depression   * lower risk for breast, uterine, and ovarian cancers   * lower risk for osteoporosis developing with age   * easier than formula - is always right with you, clean, and the right temperature   * less expensive than formula……it’s FREE !!!!     KEYS TO SUCCESSFUL BREASTFEEDING   * keep baby skin-to-skin until after first feeding event   * keep baby in your room with you during your hospital stay after delivery   * avoid any bottle feedings (unless medically necessary)   * limit the use of pacifiers and swaddling   * ask for help if you are having any issues……lactation consultants (who specialize in breastfeeding) are available to help you   * a healthy diet for mom……eating a variety of foods and portions in moderation    THINGS YOU SHOULD KNOW ABOUT BREASTFEEDING   * most medications are considered compatible with breastfeeding by the American Academy of Pediatrics, but you should check with your health care provider or lactation consultant prior to taking a new medication……just to be sure it is safe   * alcohol (beer, wine, liquor) can be passed from mother to baby through breast milk……an occasional, social drink is deemed acceptable by the American Academy of Pediatrics…..more than that should be avoided   * breastfeeding is NOT an  effective method of birth control   * nicotine (in cigarettes) can pass from mother to baby through breast milk…..however, for mothers who smoke, it is still healthier to breastfeed than use formula   * caffeine should be limited to 1-3 cups per day……includes coffee, soda, energy drinks         PERINEAL / VAGINAL MASSAGE    What can I do now to decrease my chances of tearing during delivery?  Massaging around the vaginal opening by you (or your partner), either antepartum (before birth) or during the second stage of labor, can reduce the likelihood of perineal tearing during childbirth.  Likewise, the use of warm packs held on the perineum during the pushing stage of labor can reduce the severity of your tear.  This will happen during the pushing stage of labor.  At home, you can also help reduce the chances of injury that may occur during the birth of your child through perineal massage.    When should I do this?  Starting around or shortly after 34 weeks of pregnancy, you or your partner should provide 5-10 minutes of vaginal massage 1-4 times per week.    How?  Use either almond, coconut, or olive oil and water mixture on 1 or 2 fingers (depending on comfort).  Insert finger(s) 3-5cm into the vagina.  Apply sweeping downward/sideward pressure from 3 to 9 o'clock for 5-10 minutes, 1-4 times per week.          WARNING SIGNS DURING PREGNANCY  Call our office at [phone] if you experience any of the followin. Vaginal bleeding  2. Sharp abdominal pain that does not go away  3. Fever (more than 100.4 and is not relieved by Tylenol)  4. Persistent vomiting lasting greater than 24 hours  5. Chest pain   6. Pain or burning when you urinate  7. Severe headache that doesn't resolve with Tylenol  8. Blurred vision or seeing spots in your vision  9. Sudden swelling of your face or hands  10. Redness, swelling or pain in a leg  11. A sudden weight gain in just a few days  12. Decrease in your baby's movement (after  28 weeks or the 6th month of pregnancy)  13. A loss of watery fluid from your vagina - can be a gush, a trickle or continuous wetness  14. After 20 weeks of pregnancy, rhythmic cramping (greater than 4 per hour) or menstrual like low/pelvic pain          VACCINES IN PREGNANCY    TDAP  Whooping cough (or pertussis) can be serious for anyone, but for your , it can be life-threatning.  Up to 20 babies die each year in the [place] Due to whooping cough.  About half of babies younger than 1 year old who get whooping cough need treatment in the hospital.  The younger the baby is when he or she gets whooping cough, the more likely he or she will need to be treated in a hospital.  When you receive the whooping cough vaccine (Tdap) during your pregnancy, your body will create protective antibodies and pass some of them to your baby before birth.  These antibodies can help protect your baby from getting whooping cough until they are old enough to be vaccinated themselves (usually around 6 months of age).    INFLUENZA  Changes in your immune, heart, and lung functions during pregnancy make you more likely to get seriously ill from the flu.  Catching the flu also increases your chances for serious problems for your developing baby, including premature labor and delivery.  It is recommended that all women who are pregnant during flu season should receive an influenza vaccine.     Complete lab work prior to next visit  Return in 4 weeks

## 2024-11-15 ENCOUNTER — APPOINTMENT (OUTPATIENT)
Age: 19
End: 2024-11-15
Payer: COMMERCIAL

## 2024-11-15 DIAGNOSIS — Z3A.25 25 WEEKS GESTATION OF PREGNANCY: ICD-10-CM

## 2024-11-15 DIAGNOSIS — Z34.92 PRENATAL CARE IN SECOND TRIMESTER: ICD-10-CM

## 2024-11-15 LAB
BASOPHILS # BLD MANUAL: 0 THOUSAND/UL (ref 0–0.1)
BASOPHILS NFR MAR MANUAL: 0 % (ref 0–1)
EOSINOPHIL # BLD MANUAL: 0.18 THOUSAND/UL (ref 0–0.4)
EOSINOPHIL NFR BLD MANUAL: 2 % (ref 0–6)
ERYTHROCYTE [DISTWIDTH] IN BLOOD BY AUTOMATED COUNT: 12.4 % (ref 11.6–15.1)
GLUCOSE 1H P 50 G GLC PO SERPL-MCNC: 109 MG/DL (ref 70–134)
HCT VFR BLD AUTO: 35.3 % (ref 34.8–46.1)
HGB BLD-MCNC: 11.2 G/DL (ref 11.5–15.4)
LYMPHOCYTES # BLD AUTO: 1.62 THOUSAND/UL (ref 0.6–4.47)
LYMPHOCYTES # BLD AUTO: 18 % (ref 14–44)
MCH RBC QN AUTO: 28.1 PG (ref 26.8–34.3)
MCHC RBC AUTO-ENTMCNC: 31.7 G/DL (ref 31.4–37.4)
MCV RBC AUTO: 89 FL (ref 82–98)
MONOCYTES # BLD AUTO: 0.18 THOUSAND/UL (ref 0–1.22)
MONOCYTES NFR BLD: 2 % (ref 4–12)
NEUTROPHILS # BLD MANUAL: 7.02 THOUSAND/UL (ref 1.85–7.62)
NEUTS SEG NFR BLD AUTO: 78 % (ref 43–75)
PLATELET # BLD AUTO: 194 THOUSANDS/UL (ref 149–390)
PLATELET BLD QL SMEAR: ADEQUATE
PMV BLD AUTO: 11.7 FL (ref 8.9–12.7)
POIKILOCYTOSIS BLD QL SMEAR: PRESENT
RBC # BLD AUTO: 3.98 MILLION/UL (ref 3.81–5.12)
RBC MORPH BLD: PRESENT
WBC # BLD AUTO: 9 THOUSAND/UL (ref 4.31–10.16)

## 2024-11-15 PROCEDURE — 86900 BLOOD TYPING SEROLOGIC ABO: CPT

## 2024-11-15 PROCEDURE — 86780 TREPONEMA PALLIDUM: CPT

## 2024-11-15 PROCEDURE — 86901 BLOOD TYPING SEROLOGIC RH(D): CPT

## 2024-11-15 PROCEDURE — 36415 COLL VENOUS BLD VENIPUNCTURE: CPT

## 2024-11-15 PROCEDURE — 86850 RBC ANTIBODY SCREEN: CPT

## 2024-11-16 LAB
ABO GROUP BLD: NORMAL
BLD GP AB SCN SERPL QL: NEGATIVE
RH BLD: POSITIVE
SPECIMEN EXPIRATION DATE: NORMAL
TREPONEMA PALLIDUM IGG+IGM AB [PRESENCE] IN SERUM OR PLASMA BY IMMUNOASSAY: NORMAL

## 2024-11-21 ENCOUNTER — ROUTINE PRENATAL (OUTPATIENT)
Dept: OBGYN CLINIC | Facility: CLINIC | Age: 19
End: 2024-11-21

## 2024-11-21 VITALS
HEART RATE: 102 BPM | WEIGHT: 133.4 LBS | DIASTOLIC BLOOD PRESSURE: 79 MMHG | BODY MASS INDEX: 23.64 KG/M2 | HEIGHT: 63 IN | SYSTOLIC BLOOD PRESSURE: 123 MMHG

## 2024-11-21 DIAGNOSIS — Z3A.29 29 WEEKS GESTATION OF PREGNANCY: ICD-10-CM

## 2024-11-21 DIAGNOSIS — O99.013 ANEMIA AFFECTING PREGNANCY IN THIRD TRIMESTER: ICD-10-CM

## 2024-11-21 DIAGNOSIS — Z23 ENCOUNTER FOR IMMUNIZATION: Primary | ICD-10-CM

## 2024-11-21 PROCEDURE — 99213 OFFICE O/P EST LOW 20 MIN: CPT | Performed by: OBSTETRICS & GYNECOLOGY

## 2024-11-21 RX ORDER — FERROUS SULFATE 324(65)MG
324 TABLET, DELAYED RELEASE (ENTERIC COATED) ORAL
Qty: 15 TABLET | Refills: 2 | Status: SHIPPED | OUTPATIENT
Start: 2024-11-21 | End: 2025-02-19

## 2024-11-21 NOTE — ASSESSMENT & PLAN NOTE
Mild anemia with Hgb of 11.2 appreciated on third trimester labs   Discussed every-other-day PO Fe supplementation

## 2024-11-21 NOTE — ASSESSMENT & PLAN NOTE
Analgesia: none  Ultrasound: last US 9/25/24, no further US indicated at this time   Labs: 28 week labs wnl with the exception of mild anemia   Delivery consent signed today   Contraception: desires depo provera   Next PNV 2 weeks

## 2024-11-22 LAB
DME PARACHUTE DELIVERY DATE REQUESTED: NORMAL
DME PARACHUTE ITEM DESCRIPTION: NORMAL
DME PARACHUTE ORDER STATUS: NORMAL
DME PARACHUTE SUPPLIER NAME: NORMAL
DME PARACHUTE SUPPLIER PHONE: NORMAL

## 2024-12-06 ENCOUNTER — ROUTINE PRENATAL (OUTPATIENT)
Dept: OBGYN CLINIC | Facility: CLINIC | Age: 19
End: 2024-12-06

## 2024-12-06 VITALS
HEART RATE: 91 BPM | SYSTOLIC BLOOD PRESSURE: 118 MMHG | HEIGHT: 63 IN | BODY MASS INDEX: 24.03 KG/M2 | DIASTOLIC BLOOD PRESSURE: 71 MMHG | WEIGHT: 135.6 LBS

## 2024-12-06 DIAGNOSIS — K64.9 HEMORRHOIDS, UNSPECIFIED HEMORRHOID TYPE: ICD-10-CM

## 2024-12-06 DIAGNOSIS — Z34.93 PRENATAL CARE IN THIRD TRIMESTER: Primary | ICD-10-CM

## 2024-12-06 DIAGNOSIS — K59.00 CONSTIPATION DURING PREGNANCY IN THIRD TRIMESTER: ICD-10-CM

## 2024-12-06 DIAGNOSIS — Z3A.31 31 WEEKS GESTATION OF PREGNANCY: ICD-10-CM

## 2024-12-06 DIAGNOSIS — O99.613 CONSTIPATION DURING PREGNANCY IN THIRD TRIMESTER: ICD-10-CM

## 2024-12-06 PROCEDURE — 99215 OFFICE O/P EST HI 40 MIN: CPT | Performed by: NURSE PRACTITIONER

## 2024-12-06 RX ORDER — DOCUSATE SODIUM 100 MG/1
100 CAPSULE, LIQUID FILLED ORAL 2 TIMES DAILY
Qty: 60 CAPSULE | Refills: 3 | Status: SHIPPED | OUTPATIENT
Start: 2024-12-06

## 2024-12-06 RX ORDER — HYDROCORTISONE 25 MG/G
CREAM TOPICAL 2 TIMES DAILY
Qty: 28 G | Refills: 2 | Status: SHIPPED | OUTPATIENT
Start: 2024-12-06

## 2024-12-06 NOTE — PATIENT INSTRUCTIONS
LABOR PRECAUTIONS  Call our office at [phone] for any of the following:    * I need to call immediately I if I have even a small amount of LIQUID  leaking from my vagina, with or without contractions.   * I need to call if I am BLEEDING an amount equal to or more than a period.  A small amount of bloody vaginal discharge is normal at the end of the pregnancy.   * I need to call if I am having CONTRACTIONS  every five minutes for at least an hour.  I will need a watch in order to time them.  I should time them from the beginning of one contraction until the beginning of the next one.   * I need to call BEFORE  I go to the hospital.   * I need to have a plan for TRANSPORTATION  to get to the hospital when I am in labor.  Labor is generally not an emergency which requires an ambulance.          FETAL KICK COUNTS    In the third trimester (after 28 weeks gestation) you should be performing fetal kick counts every day.  Your baby should move at least 10 times in 2 hours during an active time, once a day.    Choose atime of day when your baby is most active.  Try to do this around the same time each day.  Get into a comfortable position and then write down the time your baby first moves.  Count each movement until the baby moves 10 times.  These movements include kicks, punches, nudges, flutters, or rolls.  This can take anywhere from 5 minutes to 2 hours.  Write down the time you feel the baby's 10th movement.    If 2 hours has passed and your baby has not moved at least 10 times, you should CALL THE OFFICE RIGHT AWAY.  [phone]        PERINEAL / VAGINAL MASSAGE    What can I do now to decrease my chances of tearing during delivery?  Massaging around the vaginal opening by you (or your partner), either antepartum (before birth) or during the second stage of labor, can reduce the likelihood of perineal tearing during childbirth.  Likewise, the use of warm packs held on the perineum during the pushing stage of  labor can reduce the severity of your tear.  This will happen during the pushing stage of labor.  At home, you can also help reduce the chances of injury that may occur during the birth of your child through perineal massage.    When should I do this?  Starting around or shortly after 34 weeks of pregnancy, you or your partner should provide 5-10 minutes of vaginal massage 1-4 times per week.    How?  Use either almond, coconut, or olive oil and water mixture on 1 or 2 fingers (depending on comfort).  Insert finger(s) 3-5cm into the vagina.  Apply sweeping downward/sideward pressure from 3 to 9 o'clock for 5-10 minutes, 1-4 times per week.        GROUP B STREP    Group B Strep (GBS) is a common vaginal bacteria.  Approximately 25% of women normally have GBS bacteria present in the vagina.  It is NOT a sexually-transmitted infection.  In fact, it is not an infection AT ALL!  It is just a normal vaginal bacteria for many women.    However, the GBS bacteria can be dangerous to a  baby if the baby is exposed to that particular bacteria during labor and birth AND develops an infection from it.  The likelihood of a  GBS infection for a woman who has GBS bacteria in the vagina is about 1%-2%.  But if it does occur, a baby could become severely ill.    For this reason, we do a vaginal culture (Q-tip swab of the vagina and rectum) for ALL pregnant women at approximately 36 weeks of pregnancy.  If the culture shows that there is GBS bacteria present, it is NOT a reason to panic!  Because in this situation we will give this woman antibiotics through her IV while she is in labor.  When a mother is treated with antibiotics during labor and delivery, her baby ALMOST NEVER becomes infected with GBS bacteria.     Increase fiber in yor diet  Take colace as directed  Use Preparation H as directed  Return in 2 weeks

## 2024-12-06 NOTE — PROGRESS NOTES
"ssessment & Plan  19 y.o.  at 31w5d presenting for routine prenatal visit.   Pt C/O constipations and hemorrhoids and discussed constipation  Discussed a diet high in fiber and explained to increase water intake  Provided safe and effective use of colace and Preparation H    Problem List Items Addressed This Visit          Obstetrics/Gynecology    31 weeks gestation of pregnancy     Other Visit Diagnoses         Prenatal care in third trimester    -  Primary      Hemorrhoids, unspecified hemorrhoid type        Relevant Medications    hydrocortisone (ANUSOL-HC) 2.5 % rectal cream      Constipation during pregnancy in third trimester        Relevant Medications    docusate sodium (COLACE) 100 mg capsule          ____________________________________________________________  Subjective  She is without complaint.   She denies contractions, loss of fluid, or vaginal bleeding.   She feels regular fetal movements.     WNL respiratory effort, negative cough or SOB    Objective  /71 (BP Location: Left arm, Patient Position: Sitting, Cuff Size: Standard)   Pulse 91   Ht 5' 3\" (1.6 m)   Wt 61.5 kg (135 lb 9.6 oz)   LMP 2024 (Approximate)   BMI 24.02 kg/m²     Fetal Heart Rate: 155    Patient's Active Problem List  Patient Active Problem List   Diagnosis    Spinal curvature    Screening for HIV (human immunodeficiency virus)    Underweight    History of suicidal ideation    Major depressive disorder    Possible exposure to STD    31 weeks gestation of pregnancy    Cystic 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, antepartum    Anemia affecting pregnancy in third trimester     Plan  Increase fiber and water in the diet  Use Preparation H and Colace as discussed  To call with vaginal bleeding, loss of fluid, regular contractions, decreased fetal movement, other needs or concerns.  Return in 2 weeks  Pt verbalized understanding of all discussed.    "

## 2024-12-09 ENCOUNTER — HOSPITAL ENCOUNTER (OUTPATIENT)
Facility: HOSPITAL | Age: 19
Discharge: HOME | End: 2024-12-10
Attending: OBSTETRICS & GYNECOLOGY | Admitting: OBSTETRICS & GYNECOLOGY
Payer: COMMERCIAL

## 2024-12-09 PROBLEM — Z91.81 STATUS POST FALL: Status: ACTIVE | Noted: 2024-12-09

## 2024-12-10 VITALS
WEIGHT: 135 LBS | HEIGHT: 63 IN | DIASTOLIC BLOOD PRESSURE: 78 MMHG | BODY MASS INDEX: 23.92 KG/M2 | SYSTOLIC BLOOD PRESSURE: 129 MMHG | HEART RATE: 80 BPM | RESPIRATION RATE: 18 BRPM | OXYGEN SATURATION: 98 % | TEMPERATURE: 98.1 F

## 2024-12-10 PROCEDURE — 99203 OFFICE O/P NEW LOW 30 MIN: CPT

## 2024-12-10 PROCEDURE — 99213 OFFICE O/P EST LOW 20 MIN: CPT | Performed by: OBSTETRICS & GYNECOLOGY

## 2024-12-10 NOTE — PROGRESS NOTES
L&D Triage Note - OB/GYN  Juli Fish 19 y.o. female MRN: [id number]  Unit/Bed#:  [facility]  Encounter: [id number]      ASSESSMENT:    Juli Fish is a 19 y.o.  at 32w1d presenting for extended monitoring following a fall. Monitored until 4 hours post fall, reassuring and category I throughout. Safe for discharge home.    PLAN:    1) Status post fall  - Extended fetal monitoring  - Monitored until 4 hours s/p fall  - Category I throughout and reassuring    2) 32 weeks gestation of pregnancy  - Continue routine prenatal care  - Discharge from OB triage with  labor precautions    - Reviewed rupture of membranes, false vs true labor, decreased fetal movement, and vaginal bleeding   - Pt to call provider with any concerns and follow up at her next scheduled prenatal appointment    - Case discussed with Dr. Lincoln    SUBJECTIVE:    Juli Fish 19 y.o.  at 32w1d with an Estimated Date of Delivery: 25 who presents to triage following a fall.  She denies contractions, leakage of fluid, vaginal bleeding, and decreased fetal movement. Patient states she was walking up the stairs and fell onto her right lower abdomen/side.     Her current obstetrical history is significant for anemia      OBJECTIVE:    Vitals:    24 2150   BP: 129/78   Pulse: 80   Resp: 18   Temp: 98.1 °F (36.7 °C)   SpO2:        ROS:  Constitutional: Negative  Respiratory: Negative  Cardiovascular: Negative    Gastrointestinal: Negative    General Physical Exam:  General: Well appearing, no distress  Respiratory: Unlabored breathing  Cardiovascular: Regular rate.  Abdomen: Soft, gravid, nontender. No bruising noted on abdomen  Fundal Height: Appropriate for gestational age.  Extremities: Warm and well perfused.  Non tender.        FETAL ASSESSMENT:  Fetal heart rate: Baseline Rate (FHR): 135 bpm  Variability: Moderate  Accelerations: 15 x 15 or greater, At variable times  Decelerations: Variable,  Robert not <80 bpm, For < 60 seconds  FHR Category: Category II  Kell: Occasional contraction noted, patient not feeling        Carolynn Greer MD  12/10/2024  12:26 AM

## 2024-12-19 ENCOUNTER — ROUTINE PRENATAL (OUTPATIENT)
Dept: OBGYN CLINIC | Facility: CLINIC | Age: 19
End: 2024-12-19

## 2024-12-19 VITALS
BODY MASS INDEX: 24.87 KG/M2 | WEIGHT: 140.4 LBS | HEART RATE: 94 BPM | SYSTOLIC BLOOD PRESSURE: 123 MMHG | DIASTOLIC BLOOD PRESSURE: 79 MMHG

## 2024-12-19 DIAGNOSIS — O99.013 ANEMIA AFFECTING PREGNANCY IN THIRD TRIMESTER: ICD-10-CM

## 2024-12-19 DIAGNOSIS — O09.892 CYSTIC FIBROSIS CARRIER IN SECOND TRIMESTER, ANTEPARTUM: ICD-10-CM

## 2024-12-19 DIAGNOSIS — Z3A.33 33 WEEKS GESTATION OF PREGNANCY: Primary | ICD-10-CM

## 2024-12-19 DIAGNOSIS — Z14.1 CYSTIC FIBROSIS CARRIER IN SECOND TRIMESTER, ANTEPARTUM: ICD-10-CM

## 2024-12-19 NOTE — ASSESSMENT & PLAN NOTE
Prenatal visit completed today. Patient feels well.    Labs  Up to date with prenatal labs  Vaccines  Tdap administered  RSV administered  Delivery and postpartum planning  Mode of delivery: anticipate  without epidural  Feeding plan: breast  Contraception plan: Depo Provera    Return to clinic in 2 weeks for routine prenatal visit, scheduled for 24.

## 2024-12-19 NOTE — PROGRESS NOTES
OB/GYN Prenatal Visit    ASSESSMENT / PLAN:  1. 33 weeks gestation of pregnancy  Assessment & Plan:  Prenatal visit completed today. Patient feels well.    Labs  Up to date with prenatal labs  Vaccines  Tdap administered  RSV administered  Delivery and postpartum planning  Mode of delivery: anticipate  without epidural  Feeding plan: breast  Contraception plan: Depo Provera    Return to clinic in 2 weeks for routine prenatal visit, scheduled for 24.  Orders:  -     Respiratory Syncytial Virus (RSV) vaccine (recombinant) (Abrysvo)  2. Anemia affecting pregnancy in third trimester  Assessment & Plan:  - iron 324mg every other day  3. Cystic 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, antepartum  Assessment & Plan:  Heterozygous carrier  - recommend FOB testing    SUBJECTIVE:  Juli Fish is a 19 y.o.  at 33w4d here for prenatal visit. PMH is significant for depression. This pregnancy is complicated by anemia, cystic fibrosis carrier.    Today, she reports shortness of breath. She denies lightheadedness or chest pain. She has a prescription for supplemental iron and states she is planning to  the tablets soon.    She otherwise feels well and reports her mood has been good.    She denies contractions, vaginal bleeding, loss of fluid, or decreased fetal movement. She denies headaches unresolved with Tylenol, visual disturbance(s), or RUQ pain.    OBJECTIVE:  Vitals:    24 1410   BP: 123/79   Pulse: 94     FHT: 130bpm  Fundal height: 31cm    Physical Exam:  General: Well appearing, no acute distress  Cardiovascular: Regular rate  Respiratory: Unlabored breathing on room air  Abdomen: Soft, gravid, nontender    Patient seen and examined with attending physician Dr. Afshin Madden MD  OBGYN PGY-1  24  5:30 PM

## 2025-01-02 ENCOUNTER — TELEPHONE (OUTPATIENT)
Dept: OBGYN CLINIC | Facility: CLINIC | Age: 20
End: 2025-01-02

## 2025-01-02 ENCOUNTER — HOSPITAL ENCOUNTER (OUTPATIENT)
Facility: HOSPITAL | Age: 20
Discharge: HOME | End: 2025-01-02
Attending: OBSTETRICS & GYNECOLOGY | Admitting: OBSTETRICS & GYNECOLOGY
Payer: COMMERCIAL

## 2025-01-02 VITALS
RESPIRATION RATE: 20 BRPM | SYSTOLIC BLOOD PRESSURE: 110 MMHG | DIASTOLIC BLOOD PRESSURE: 71 MMHG | OXYGEN SATURATION: 98 % | TEMPERATURE: 97.8 F | HEART RATE: 94 BPM

## 2025-01-02 PROBLEM — Z3A.35 35 WEEKS GESTATION OF PREGNANCY: Status: ACTIVE | Noted: 2024-08-12

## 2025-01-02 PROBLEM — O26.893 PELVIC PAIN AFFECTING PREGNANCY IN THIRD TRIMESTER, ANTEPARTUM: Status: ACTIVE | Noted: 2025-01-02

## 2025-01-02 PROBLEM — R10.2 PELVIC PAIN AFFECTING PREGNANCY IN THIRD TRIMESTER, ANTEPARTUM: Status: ACTIVE | Noted: 2025-01-02

## 2025-01-02 PROCEDURE — 99213 OFFICE O/P EST LOW 20 MIN: CPT

## 2025-01-02 PROCEDURE — NC001 PR NO CHARGE: Performed by: OBSTETRICS & GYNECOLOGY

## 2025-01-02 RX ORDER — ACETAMINOPHEN 325 MG/1
975 TABLET ORAL ONCE
Status: DISCONTINUED | OUTPATIENT
Start: 2025-01-02 | End: 2025-01-02 | Stop reason: HOSPADM

## 2025-01-02 NOTE — TELEPHONE ENCOUNTER
Patient called experiencing  sharp pains in stomach. Patient not sure if they are contractions. No other symptoms at this time. Patient will be going the hospital.

## 2025-01-02 NOTE — PROGRESS NOTES
"L&D Triage Note - OB/GYN  Juli Fish 19 y.o. female MRN: [id number]  Unit/Bed#:  [facility]  Encounter: [id number]      ASSESSMENT:    Juli Fish is a 19 y.o.  at 35w4d presenting with intermittent shooting pelvic pain.     PLAN:    1) R/o Pre-term labor  - NST reactive with accelerations  - Cervix 1.5/50/-3  - No LOF, vaginal bleeding, decreased fetal movement  - Will continue to monitor perform 2 hour re-check  - If not in labor, can discharge home with f/u precuations    2) Headache  - Normal vital signs, no visual disturbances, no hyperreflexia  - Given Tylenol 975 mg  - Will re-evaluate headache    3) 35 weeks gestation of pregnancy  - Continue routine prenatal care  - Discharge from OB triage with  labor precautions    - Reviewed rupture of membranes, false vs true labor, decreased fetal movement, and vaginal bleeding   - Pt to call provider with any concerns and follow up at her next scheduled prenatal appointment    - Case discussed with Dr. Izaguirre    SUBJECTIVE:    Juli Fish 19 y.o.  at 35w4d with an Estimated Date of Delivery: 25 who presents to triage for intermittent pelvic pain that began this morning. Pt states that the pain is sharp/shooting and located near b/l inguinal folds as well as right lateral hip. Pt states pain lasts for a few seconds and subsides. Patient believes she has had \"lightning crotch\" in the past and pain feels similar but more severe than before. She otherwise denies leakage of fluid, vaginal bleeding, and decreased fetal movement. She is unsure if she is having contractions but does admit to some lower back tension. Pt also admit to a left sided katelynn-orbital headache that began this morning. Pain is 4/10 intensity and pt denies visual or auditory disturbances.    Her current obstetrical history is significant for cystic fibrosis carrier, anemia, and history of major depressive disorder    Her past obstetrical history is " significant for none.    OBJECTIVE:    Vitals:    01/02/25 1733   BP: 110/71   Pulse: 94   Resp:    Temp:    SpO2:        ROS:  Constitutional: Negative  Respiratory: Negative  Cardiovascular: Negative    Gastrointestinal: Negative    General Physical Exam:  General: Well appearing, no distress  Respiratory: Unlabored breathing  Cardiovascular: Regular rate.  Abdomen: Soft, gravid, nontender  Fundal Height: Appropriate for gestational age.  Extremities: Warm and well perfused.  Non tender.      Cervical Exam  Speculum: Cervical os is 1.5 cm dilated with 50% effacement      FETAL ASSESSMENT:  Fetal heart rate: Baseline Rate (FHR): 130 bpm  Variability: Moderate  Accelerations: 15 x 15 or greater, With fetal movement  Decelerations: None  FHR Category: Category I  Vancleave: Contraction Frequency (minutes): 2-3  Contraction Duration (seconds): 50-60  Contraction Intensity: Mild      Jb Villegas DO  1/2/2025  8:22 PM

## 2025-01-06 ENCOUNTER — ROUTINE PRENATAL (OUTPATIENT)
Dept: OBGYN CLINIC | Facility: CLINIC | Age: 20
End: 2025-01-06

## 2025-01-06 VITALS
HEART RATE: 88 BPM | DIASTOLIC BLOOD PRESSURE: 75 MMHG | SYSTOLIC BLOOD PRESSURE: 126 MMHG | WEIGHT: 141.6 LBS | BODY MASS INDEX: 25.08 KG/M2

## 2025-01-06 DIAGNOSIS — E84.9 CYSTIC FIBROSIS (HCC): ICD-10-CM

## 2025-01-06 DIAGNOSIS — Z34.93 PRENATAL CARE IN THIRD TRIMESTER: Primary | ICD-10-CM

## 2025-01-06 DIAGNOSIS — Z3A.36 36 WEEKS GESTATION OF PREGNANCY: ICD-10-CM

## 2025-01-06 PROCEDURE — 87150 DNA/RNA AMPLIFIED PROBE: CPT | Performed by: NURSE PRACTITIONER

## 2025-01-06 PROCEDURE — 99215 OFFICE O/P EST HI 40 MIN: CPT | Performed by: NURSE PRACTITIONER

## 2025-01-06 NOTE — PROGRESS NOTES
Assessment & Plan  19 y.o.  at 36w1d presenting for routine prenatal visit.   GBS collected today  Pt had an abnormal screen for cystic fibrosis in September, pt states she never had genetic counseling. Discussed testing for FOB who was present for visit, he plans testing. Genetic counseling was ordered today        Problem List Items Addressed This Visit          Obstetrics/Gynecology    36 weeks gestation of pregnancy    Relevant Orders    Strep B DNA probe, amplification     Other Visit Diagnoses         Prenatal care in third trimester    -  Primary    Relevant Orders    Strep B DNA probe, amplification          ____________________________________________________________  Subjective  She is without complaint.   She denies contractions, loss of fluid, or vaginal bleeding.   She feels regular fetal movements.     WNL respiratory effort, negative cough or SOB      Objective  /75 (BP Location: Right arm, Patient Position: Sitting, Cuff Size: Standard)   Pulse 88   Wt 64.2 kg (141 lb 9.6 oz)   LMP 2024 (Approximate)   BMI 25.08 kg/m²          Patient's Active Problem List  Patient Active Problem List   Diagnosis    Spinal curvature    Screening for HIV (human immunodeficiency virus)    Underweight    History of suicidal ideation    Major depressive disorder    Possible exposure to STD    36 weeks gestation of pregnancy    Cystic 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, antepartum    Anemia affecting pregnancy in third trimester    Status post fall    Pelvic pain affecting pregnancy in third trimester, antepartum     Plan  Schedule Genetic counseling appointment  To call with vaginal bleeding, loss of fluid, regular contractions, decreased fetal movement, other needs or concerns..  Return in 1 week  Pt verbalized understanding of all discussed.

## 2025-01-06 NOTE — PATIENT INSTRUCTIONS
LABOR PRECAUTIONS  Call our office at [phone] for any of the following:    * I need to call immediately I if I have even a small amount of LIQUID  leaking from my vagina, with or without contractions.   * I need to call if I am BLEEDING an amount equal to or more than a period.  A small amount of bloody vaginal discharge is normal at the end of the pregnancy.   * I need to call if I am having CONTRACTIONS  every five minutes for at least an hour.  I will need a watch in order to time them.  I should time them from the beginning of one contraction until the beginning of the next one.   * I need to call BEFORE  I go to the hospital.   * I need to have a plan for TRANSPORTATION  to get to the hospital when I am in labor.  Labor is generally not an emergency which requires an ambulance.          FETAL KICK COUNTS    In the third trimester (after 28 weeks gestation) you should be performing fetal kick counts every day.  Your baby should move at least 10 times in 2 hours during an active time, once a day.    Choose atime of day when your baby is most active.  Try to do this around the same time each day.  Get into a comfortable position and then write down the time your baby first moves.  Count each movement until the baby moves 10 times.  These movements include kicks, punches, nudges, flutters, or rolls.  This can take anywhere from 5 minutes to 2 hours.  Write down the time you feel the baby's 10th movement.    If 2 hours has passed and your baby has not moved at least 10 times, you should CALL THE OFFICE RIGHT AWAY.  [phone]        PERINEAL / VAGINAL MASSAGE    What can I do now to decrease my chances of tearing during delivery?  Massaging around the vaginal opening by you (or your partner), either antepartum (before birth) or during the second stage of labor, can reduce the likelihood of perineal tearing during childbirth.  Likewise, the use of warm packs held on the perineum during the pushing stage of  labor can reduce the severity of your tear.  This will happen during the pushing stage of labor.  At home, you can also help reduce the chances of injury that may occur during the birth of your child through perineal massage.    When should I do this?  Starting around or shortly after 34 weeks of pregnancy, you or your partner should provide 5-10 minutes of vaginal massage 1-4 times per week.    How?  Use either almond, coconut, or olive oil and water mixture on 1 or 2 fingers (depending on comfort).  Insert finger(s) 3-5cm into the vagina.  Apply sweeping downward/sideward pressure from 3 to 9 o'clock for 5-10 minutes, 1-4 times per week.        GROUP B STREP    Group B Strep (GBS) is a common vaginal bacteria.  Approximately 25% of women normally have GBS bacteria present in the vagina.  It is NOT a sexually-transmitted infection.  In fact, it is not an infection AT ALL!  It is just a normal vaginal bacteria for many women.    However, the GBS bacteria can be dangerous to a  baby if the baby is exposed to that particular bacteria during labor and birth AND develops an infection from it.  The likelihood of a  GBS infection for a woman who has GBS bacteria in the vagina is about 1%-2%.  But if it does occur, a baby could become severely ill.    For this reason, we do a vaginal culture (Q-tip swab of the vagina and rectum) for ALL pregnant women at approximately 36 weeks of pregnancy.  If the culture shows that there is GBS bacteria present, it is NOT a reason to panic!  Because in this situation we will give this woman antibiotics through her IV while she is in labor.  When a mother is treated with antibiotics during labor and delivery, her baby ALMOST NEVER becomes infected with GBS bacteria.    Schedule genetic counseling appointment   Return in 1 week

## 2025-01-07 ENCOUNTER — TELEPHONE (OUTPATIENT)
Age: 20
End: 2025-01-07

## 2025-01-08 LAB — GP B STREP DNA SPEC QL NAA+PROBE: POSITIVE

## 2025-01-09 PROBLEM — B95.1 GROUP BETA STREP POSITIVE: Status: ACTIVE | Noted: 2025-01-09

## 2025-01-14 ENCOUNTER — ROUTINE PRENATAL (OUTPATIENT)
Dept: OBGYN CLINIC | Facility: CLINIC | Age: 20
End: 2025-01-14

## 2025-01-14 VITALS — DIASTOLIC BLOOD PRESSURE: 79 MMHG | BODY MASS INDEX: 25.54 KG/M2 | SYSTOLIC BLOOD PRESSURE: 134 MMHG | WEIGHT: 144.2 LBS

## 2025-01-14 DIAGNOSIS — Z14.1 CYSTIC FIBROSIS CARRIER IN SECOND TRIMESTER, ANTEPARTUM: ICD-10-CM

## 2025-01-14 DIAGNOSIS — Z3A.37 37 WEEKS GESTATION OF PREGNANCY: Primary | ICD-10-CM

## 2025-01-14 DIAGNOSIS — B95.1 GROUP BETA STREP POSITIVE: ICD-10-CM

## 2025-01-14 DIAGNOSIS — O09.892 CYSTIC FIBROSIS CARRIER IN SECOND TRIMESTER, ANTEPARTUM: ICD-10-CM

## 2025-01-14 PROCEDURE — 99214 OFFICE O/P EST MOD 30 MIN: CPT | Performed by: OBSTETRICS & GYNECOLOGY

## 2025-01-14 NOTE — PROGRESS NOTES
OB/GYN Prenatal Visit    SUBJECTIVE:  Juli Fish is a 19 y.o.  at 37w2d here for prenatal visit.  She has no obstetric complaints and denies pelvic pain, cramping/contractions, vaginal bleeding, loss of fluid, or decreased fetal movement.     OBJECTIVE:  Vitals:    25 1555   BP: 134/79       FHT: 148 bpm  Fundal height: 37 cm  SVE: 0.5/50/-3    Physical Exam  Constitutional:       Appearance: Normal appearance.   Genitourinary:      Vulva normal.   Pulmonary:      Effort: Pulmonary effort is normal. No respiratory distress.   Abdominal:      Palpations: Abdomen is soft.      Tenderness: There is no abdominal tenderness.      Comments: Gravid uterus   Neurological:      Mental Status: She is alert.   Skin:     General: Skin is warm and dry.   Psychiatric:         Mood and Affect: Mood normal.         Behavior: Behavior normal.   Vitals reviewed.       ASSESSMENT / PLAN:    Problem List       Spinal curvature    Screening for HIV (human immunodeficiency virus)    Underweight    History of suicidal ideation    Major depressive disorder    Possible exposure to STD    37 weeks gestation of pregnancy    Overview   Flu vax administered 10/21/24         Cystic 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, antepartum    Overview   Heterozygous carrier  Partner should be tested         Anemia affecting pregnancy in third trimester    Status post fall    Pelvic pain affecting pregnancy in third trimester, antepartum    Group beta Strep positive    Overview   Will need antibiotics in labor            Instructions about labor precautions and kick counts   Patient education about GBS  Return in 7 days        Mat Wynne MD  2025  7:37 PM

## 2025-01-15 ENCOUNTER — OFFICE VISIT (OUTPATIENT)
Facility: HOSPITAL | Age: 20
End: 2025-01-15

## 2025-01-15 DIAGNOSIS — Z31.5 ENCOUNTER FOR PROCREATIVE GENETIC COUNSELING: ICD-10-CM

## 2025-01-15 DIAGNOSIS — Z14.1 CYSTIC FIBROSIS CARRIER: Primary | ICD-10-CM

## 2025-01-15 DIAGNOSIS — Z31.440 ENCOUNTER OF MALE FOR TESTING FOR GENETIC DISEASE CARRIER STATUS FOR PROCREATIVE MANAGEMENT: ICD-10-CM

## 2025-01-15 PROCEDURE — NC001 PR NO CHARGE

## 2025-01-15 NOTE — PROGRESS NOTES
Genetic Counseling Note    Appointment Date:  1/15/2025  Referred By: Jaja Elena C*  YOB: 2005  Partner:  Balwinder Hernandez  Indication for Visit:   cystic fibrosis carrier status  Pregnancy History:   Estimated Date of Delivery: 2025  Estimated Gestational Age: 37w3d    Juli is a 19 y.o. female who presented with her mother and sister for genetic counseling to discuss her cystic fibrosis carrier screening results. 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 This test appears to have resulted on 10/10/2024. Discussion of the result with the patient is documented on 2024, and a genetic counseling referral was placed on 2025. Juli reports that she initiated the recent discussion of her carrier screening results with her OB providers after she saw the result in her chart and researched it online.    We discussed that cystic fibrosis (CF) is a genetic condition in which biallelic pathogenic variants in the CFTR gene lead to reduced or absent function of the cystic fibrosis transmembrane conductance regulator protein. CF affects epithelia in several organs resulting in complex, multi-system disease including the exocrine pancreas, intestine, respiratory tract, hepatobiliary system, and the exocrine sweat glands. Prognosis and treatment options are dependent in part upon the specific variants present in an affected individual. We reviewed the autosomal recessive inheritance pattern. Should the father of the baby be a carrier, the pregnancy is at 25% risk to be affected. We reviewed  screening [facility] for cystic fibrosis. Juli states that her partner Balwinder would like to pursue CF carrier screening. She is unsure if Balwinder's primary care physician has placed this test  "order for him or not. We discussed that I will not go in Balwinder's chart without permission, but if he calls me and gives consent I will be happy to either place the test order or verify that it has been ordered correctly by someone else.     We discussed that Addies CFTR variant has a variable medical impact. Some individuals with biallelic CFTR variants, with one being Juli's variant, may have no CF symptoms. Others may have some symptoms, but are not likely to have the \"classic\" or most severe disease phenotype. Individuals with non-classic CF may have chronic respiratory infections, pancreatitis, and/or congenital bilateral absence of the vas deferens (CBAVD). The mildest cases of CF may present only as CBAVD and be unascertained until adulthood. We discussed that  screening for cystic fibrosis may be performed via biochemical rather than molecular analysis (by measuring immunoreactive trypsinogen rather than sequencing the CFTR gene). Given the potentially milder presentation associated with Addies CFTR variant, it is possible that a baby with non-classic CF could have a false negative on  screening but still have later symptoms that warrant surveillance. We reviewed that based on Juli's gestational age, it is unlikely that [facility] CF carrier screening will result before she gives birth; it may also result after her son's  screening. If Balwinder is found to be a carrier and  screening for CF was negative, I recommend further follow-up through Juli's son's pediatrician to determine if he has inherited both of his parents' CFTR variants. Given the variable impact of Addies CFTR variant, the discovery of two CFTR variants in Juli's son would not confirm a clinical diagnosis of CF. However, further follow-up with a CF treatment center would be appropriate.     Juli and her family were appreciative of the opportunity to learn more about her son's risk for CF. " She agrees with the above plan and will follow up as appropriate. I would be happy to consult with any other parties involved in Juli's or her son's care to discuss testing options and next steps. We discussed that any of Juli's half-siblings have a 25% chance of also being a cystic fibrosis carrier. Most of these siblings are reported to be under 18. If they desire carrier screening when they are older, they are welcome to contact us.    Histories for the patient and her partner's family were taken during our session. Juli states that her paternal half-brother (father's son), age 12, has been diagnosed with autism. We reviewed the general population risk for a diagnosis of autism, which is estimated at approximately 1/. We also reviewed the possible etiologies of autism, including multifactorial and genetic. Autism may be secondary to a chromosomal abnormality or single gene disorder; a genetic cause can now be identified in up to approximately 35-40% of cases. However there are over 800 genes associated with autism that have been identified to date, making a prenatal diagnosis and risk assessment difficult without records on the affected individual.     Further review of family history for the patient and her partner was noncontributory. The family history was not significant for genetic diseases or disorders, birth defects, fetal loss, or consanguinity. Patient reports being of  descent and that her partner is of /[place] descent. She denies either of them having known Ashkenazi Pentec[employer]al ancestry. Juli has already completed negative carrier screening for spinal muscular atrophy and fragile X syndrome.    Lastly, we discussed the fact that everyone in the general population regardless of age, family history, or medical background has approximately a 3-5% risk of having a child with some type of congenital anomaly, genetic disease or intellectual disability. Currently there are  no tests available to rule out all birth defects or health problems.    Juli was provided with our contact information. In the event that we need to reach Juli, she confirmed that both phone and [employer] message are acceptable. I encouraged her to call with any questions or concerns.    Plan/Tests Ordered:  1) Patient states partner would like CF carrier screening, he will contact me to place order if his PCP cannot do it.  2)  follow-up with potential CFTR gene sequencing recommended for patient's baby, if both parents are carriers.    Time spent with Genetic Counselor: 30 minutes

## 2025-01-21 ENCOUNTER — ROUTINE PRENATAL (OUTPATIENT)
Dept: OBGYN CLINIC | Facility: CLINIC | Age: 20
End: 2025-01-21

## 2025-01-21 VITALS
BODY MASS INDEX: 25.91 KG/M2 | HEART RATE: 92 BPM | SYSTOLIC BLOOD PRESSURE: 121 MMHG | WEIGHT: 146.2 LBS | DIASTOLIC BLOOD PRESSURE: 79 MMHG | HEIGHT: 63 IN

## 2025-01-21 DIAGNOSIS — Z34.93 PRENATAL CARE IN THIRD TRIMESTER: Primary | ICD-10-CM

## 2025-01-21 DIAGNOSIS — Z3A.38 38 WEEKS GESTATION OF PREGNANCY: ICD-10-CM

## 2025-01-21 PROCEDURE — 99215 OFFICE O/P EST HI 40 MIN: CPT | Performed by: NURSE PRACTITIONER

## 2025-01-21 NOTE — PATIENT INSTRUCTIONS
LABOR PRECAUTIONS  Call our office at [phone] for any of the following:    * I need to call immediately I if I have even a small amount of LIQUID  leaking from my vagina, with or without contractions.   * I need to call if I am BLEEDING an amount equal to or more than a period.  A small amount of bloody vaginal discharge is normal at the end of the pregnancy.   * I need to call if I am having CONTRACTIONS  every five minutes for at least an hour.  I will need a watch in order to time them.  I should time them from the beginning of one contraction until the beginning of the next one.   * I need to call BEFORE  I go to the hospital.   * I need to have a plan for TRANSPORTATION  to get to the hospital when I am in labor.  Labor is generally not an emergency which requires an ambulance.          FETAL KICK COUNTS    In the third trimester (after 28 weeks gestation) you should be performing fetal kick counts every day.  Your baby should move at least 10 times in 2 hours during an active time, once a day.    Choose atime of day when your baby is most active.  Try to do this around the same time each day.  Get into a comfortable position and then write down the time your baby first moves.  Count each movement until the baby moves 10 times.  These movements include kicks, punches, nudges, flutters, or rolls.  This can take anywhere from 5 minutes to 2 hours.  Write down the time you feel the baby's 10th movement.    If 2 hours has passed and your baby has not moved at least 10 times, you should CALL THE OFFICE RIGHT AWAY.  [phone]        PERINEAL / VAGINAL MASSAGE    What can I do now to decrease my chances of tearing during delivery?  Massaging around the vaginal opening by you (or your partner), either antepartum (before birth) or during the second stage of labor, can reduce the likelihood of perineal tearing during childbirth.  Likewise, the use of warm packs held on the perineum during the pushing stage of  labor can reduce the severity of your tear.  This will happen during the pushing stage of labor.  At home, you can also help reduce the chances of injury that may occur during the birth of your child through perineal massage.    When should I do this?  Starting around or shortly after 34 weeks of pregnancy, you or your partner should provide 5-10 minutes of vaginal massage 1-4 times per week.    How?  Use either almond, coconut, or olive oil and water mixture on 1 or 2 fingers (depending on comfort).  Insert finger(s) 3-5cm into the vagina.  Apply sweeping downward/sideward pressure from 3 to 9 o'clock for 5-10 minutes, 1-4 times per week.        GROUP B STREP    Group B Strep (GBS) is a common vaginal bacteria.  Approximately 25% of women normally have GBS bacteria present in the vagina.  It is NOT a sexually-transmitted infection.  In fact, it is not an infection AT ALL!  It is just a normal vaginal bacteria for many women.    However, the GBS bacteria can be dangerous to a  baby if the baby is exposed to that particular bacteria during labor and birth AND develops an infection from it.  The likelihood of a  GBS infection for a woman who has GBS bacteria in the vagina is about 1%-2%.  But if it does occur, a baby could become severely ill.    For this reason, we do a vaginal culture (Q-tip swab of the vagina and rectum) for ALL pregnant women at approximately 36 weeks of pregnancy.  If the culture shows that there is GBS bacteria present, it is NOT a reason to panic!  Because in this situation we will give this woman antibiotics through her IV while she is in labor.  When a mother is treated with antibiotics during labor and delivery, her baby ALMOST NEVER becomes infected with GBS bacteria.    Present for induction of labor 2025 @ 8 pm  Return for postpartum care

## 2025-01-21 NOTE — PROGRESS NOTES
"Assessment & Plan  19 y.o.  at 38w2d presenting for routine prenatal visit.   Pt is requesting induction of labor  Discussed need for cervical ripening and procedure, also discussed IV Pitocin. Explained labor maybe longer and there can be an increased risk of C/S  Pt would like induction of labor wgich was scheduled 2025 @ 8 pm     Problem List Items Addressed This Visit          Obstetrics/Gynecology    38 weeks gestation of pregnancy     Other Visit Diagnoses         Prenatal care in third trimester    -  Primary          ____________________________________________________________  Subjective  She is without complaint.   She denies contractions, loss of fluid, or vaginal bleeding.   She feels regular fetal movements.     WNL respiratory effort, negative cough or SOB    Objective  /79 (BP Location: Left arm, Patient Position: Sitting, Cuff Size: Standard)   Pulse 92   Ht 5' 3\" (1.6 m)   Wt 66.3 kg (146 lb 3.2 oz)   LMP 2024 (Approximate)   BMI 25.90 kg/m²          Patient's Active Problem List  Patient Active Problem List   Diagnosis    Spinal curvature    Screening for HIV (human immunodeficiency virus)    Underweight    History of suicidal ideation    Major depressive disorder    Possible exposure to STD    38 weeks gestation of pregnancy    Cystic 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, antepartum    Anemia affecting pregnancy in third trimester    Status post fall    Pelvic pain affecting pregnancy in third trimester, antepartum    Group beta Strep positive     Plan  Present for induction of labor 2025 8 pm  To call with vaginal bleeding, loss of fluid, regular contractions, decreased fetal movement, other needs or concerns.  Return for postpartum care  Pt verbalized understanding of all discussed.    "

## 2025-01-26 ENCOUNTER — HOSPITAL ENCOUNTER (OUTPATIENT)
Dept: LABOR AND DELIVERY | Facility: HOSPITAL | Age: 20
Discharge: HOME | End: 2025-01-26
Payer: COMMERCIAL

## 2025-01-26 ENCOUNTER — HOSPITAL ENCOUNTER (INPATIENT)
Facility: HOSPITAL | Age: 20
LOS: 3 days | Discharge: HOME | End: 2025-01-29
Attending: OBSTETRICS & GYNECOLOGY | Admitting: OBSTETRICS & GYNECOLOGY
Payer: COMMERCIAL

## 2025-01-26 PROBLEM — Z34.90 ENCOUNTER FOR ELECTIVE INDUCTION OF LABOR: Status: ACTIVE | Noted: 2025-01-26

## 2025-01-26 PROBLEM — Z3A.39 39 WEEKS GESTATION OF PREGNANCY: Status: ACTIVE | Noted: 2024-08-12

## 2025-01-26 LAB
ABO GROUP BLD: NORMAL
BLD GP AB SCN SERPL QL: NEGATIVE
ERYTHROCYTE [DISTWIDTH] IN BLOOD BY AUTOMATED COUNT: 14.3 % (ref 11.6–15.1)
HCT VFR BLD AUTO: 32.6 % (ref 34.8–46.1)
HGB BLD-MCNC: 10 G/DL (ref 11.5–15.4)
HOLD SPECIMEN: YES
MCH RBC QN AUTO: 24.4 PG (ref 26.8–34.3)
MCHC RBC AUTO-ENTMCNC: 30.7 G/DL (ref 31.4–37.4)
MCV RBC AUTO: 80 FL (ref 82–98)
PLATELET # BLD AUTO: 225 THOUSANDS/UL (ref 149–390)
PMV BLD AUTO: 10.9 FL (ref 8.9–12.7)
RBC # BLD AUTO: 4.09 MILLION/UL (ref 3.81–5.12)
RH BLD: POSITIVE
SPECIMEN EXPIRATION DATE: NORMAL
WBC # BLD AUTO: 8.91 THOUSAND/UL (ref 4.31–10.16)

## 2025-01-26 PROCEDURE — 86900 BLOOD TYPING SEROLOGIC ABO: CPT

## 2025-01-26 PROCEDURE — NC001 PR NO CHARGE: Performed by: OBSTETRICS & GYNECOLOGY

## 2025-01-26 PROCEDURE — 86901 BLOOD TYPING SEROLOGIC RH(D): CPT

## 2025-01-26 PROCEDURE — 85027 COMPLETE CBC AUTOMATED: CPT | Performed by: OBSTETRICS & GYNECOLOGY

## 2025-01-26 PROCEDURE — 86850 RBC ANTIBODY SCREEN: CPT

## 2025-01-26 PROCEDURE — 86780 TREPONEMA PALLIDUM: CPT

## 2025-01-26 PROCEDURE — 3E0DXGC INTRODUCTION OF OTHER THERAPEUTIC SUBSTANCE INTO MOUTH AND PHARYNX, EXTERNAL APPROACH: ICD-10-PCS | Performed by: OBSTETRICS & GYNECOLOGY

## 2025-01-26 RX ORDER — BUPIVACAINE HYDROCHLORIDE 2.5 MG/ML
30 INJECTION, SOLUTION EPIDURAL; INFILTRATION; INTRACAUDAL ONCE AS NEEDED
Status: DISCONTINUED | OUTPATIENT
Start: 2025-01-26 | End: 2025-01-27

## 2025-01-26 RX ORDER — SODIUM CHLORIDE, SODIUM LACTATE, POTASSIUM CHLORIDE, CALCIUM CHLORIDE 600; 310; 30; 20 MG/100ML; MG/100ML; MG/100ML; MG/100ML
125 INJECTION, SOLUTION INTRAVENOUS CONTINUOUS
Status: DISCONTINUED | OUTPATIENT
Start: 2025-01-26 | End: 2025-01-27

## 2025-01-26 RX ORDER — ONDANSETRON 2 MG/ML
4 INJECTION INTRAMUSCULAR; INTRAVENOUS EVERY 6 HOURS PRN
Status: DISCONTINUED | OUTPATIENT
Start: 2025-01-26 | End: 2025-01-27

## 2025-01-26 RX ORDER — ONDANSETRON 2 MG/ML
4 INJECTION INTRAMUSCULAR; INTRAVENOUS EVERY 8 HOURS PRN
Status: DISCONTINUED | OUTPATIENT
Start: 2025-01-26 | End: 2025-01-26 | Stop reason: SDUPTHER

## 2025-01-26 RX ORDER — ACETAMINOPHEN 325 MG/1
975 TABLET ORAL EVERY 8 HOURS PRN
Status: DISCONTINUED | OUTPATIENT
Start: 2025-01-26 | End: 2025-01-27

## 2025-01-26 RX ADMIN — SODIUM CHLORIDE 5 MILLION UNITS: 0.9 INJECTION, SOLUTION INTRAVENOUS at 21:04

## 2025-01-26 RX ADMIN — SODIUM CHLORIDE, SODIUM LACTATE, POTASSIUM CHLORIDE, AND CALCIUM CHLORIDE 125 ML/HR: .6; .31; .03; .02 INJECTION, SOLUTION INTRAVENOUS at 21:00

## 2025-01-26 RX ADMIN — Medication 25 MCG: at 22:25

## 2025-01-27 ENCOUNTER — ANESTHESIA (INPATIENT)
Dept: ANESTHESIOLOGY | Facility: HOSPITAL | Age: 20
End: 2025-01-27
Payer: COMMERCIAL

## 2025-01-27 ENCOUNTER — ANESTHESIA EVENT (INPATIENT)
Dept: ANESTHESIOLOGY | Facility: HOSPITAL | Age: 20
End: 2025-01-27
Payer: COMMERCIAL

## 2025-01-27 LAB
BASE EXCESS BLDCOA CALC-SCNC: -6.6 MMOL/L (ref 3–11)
BASE EXCESS BLDCOV CALC-SCNC: -6.7 MMOL/L (ref 1–9)
HCO3 BLDCOA-SCNC: 21.6 MMOL/L (ref 17.3–27.3)
HCO3 BLDCOV-SCNC: 18.9 MMOL/L (ref 12.2–28.6)
O2 CT VFR BLDCOA CALC: 6.5 ML/DL
OXYHGB MFR BLDCOA: 27.3 %
OXYHGB MFR BLDCOV: 59.9 %
PCO2 BLDCOA: 53 MM[HG] (ref 30–60)
PCO2 BLDCOV: 38.6 MM HG (ref 27–43)
PH BLDCOA: 7.23 [PH] (ref 7.23–7.43)
PH BLDCOV: 7.31 [PH] (ref 7.19–7.49)
PO2 BLDCOA: 14 MM HG (ref 5–25)
PO2 BLDCOV: 23.2 MM HG (ref 15–45)
SAO2 % BLDCOV: 13.9 ML/DL
TREPONEMA PALLIDUM IGG+IGM AB [PRESENCE] IN SERUM OR PLASMA BY IMMUNOASSAY: NORMAL

## 2025-01-27 PROCEDURE — 59409 OBSTETRICAL CARE: CPT | Performed by: OBSTETRICS & GYNECOLOGY

## 2025-01-27 PROCEDURE — 82805 BLOOD GASES W/O2 SATURATION: CPT | Performed by: OBSTETRICS & GYNECOLOGY

## 2025-01-27 PROCEDURE — 4A1HXCZ MONITORING OF PRODUCTS OF CONCEPTION, CARDIAC RATE, EXTERNAL APPROACH: ICD-10-PCS | Performed by: OBSTETRICS & GYNECOLOGY

## 2025-01-27 RX ORDER — BENZOCAINE/MENTHOL 6 MG-10 MG
1 LOZENGE MUCOUS MEMBRANE DAILY PRN
Status: DISCONTINUED | OUTPATIENT
Start: 2025-01-27 | End: 2025-01-29 | Stop reason: HOSPADM

## 2025-01-27 RX ORDER — ONDANSETRON 2 MG/ML
4 INJECTION INTRAMUSCULAR; INTRAVENOUS EVERY 8 HOURS PRN
Status: DISCONTINUED | OUTPATIENT
Start: 2025-01-27 | End: 2025-01-29 | Stop reason: HOSPADM

## 2025-01-27 RX ORDER — ROPIVACAINE HYDROCHLORIDE 2 MG/ML
INJECTION, SOLUTION EPIDURAL; INFILTRATION; PERINEURAL
Status: COMPLETED | OUTPATIENT
Start: 2025-01-27 | End: 2025-01-27

## 2025-01-27 RX ORDER — ACETAMINOPHEN 325 MG/1
650 TABLET ORAL EVERY 4 HOURS PRN
Status: DISCONTINUED | OUTPATIENT
Start: 2025-01-27 | End: 2025-01-29 | Stop reason: HOSPADM

## 2025-01-27 RX ORDER — DIPHENHYDRAMINE HCL 25 MG
25 TABLET ORAL EVERY 6 HOURS PRN
Status: DISCONTINUED | OUTPATIENT
Start: 2025-01-27 | End: 2025-01-29 | Stop reason: HOSPADM

## 2025-01-27 RX ORDER — IBUPROFEN 600 MG/1
600 TABLET, FILM COATED ORAL EVERY 6 HOURS
Status: DISCONTINUED | OUTPATIENT
Start: 2025-01-27 | End: 2025-01-29 | Stop reason: HOSPADM

## 2025-01-27 RX ORDER — SIMETHICONE 80 MG
80 TABLET,CHEWABLE ORAL 4 TIMES DAILY PRN
Status: DISCONTINUED | OUTPATIENT
Start: 2025-01-27 | End: 2025-01-29 | Stop reason: HOSPADM

## 2025-01-27 RX ORDER — DIPHENHYDRAMINE HYDROCHLORIDE 50 MG/ML
12.5 INJECTION INTRAMUSCULAR; INTRAVENOUS EVERY 6 HOURS PRN
Status: DISCONTINUED | OUTPATIENT
Start: 2025-01-27 | End: 2025-01-27

## 2025-01-27 RX ORDER — DOCUSATE SODIUM 100 MG/1
100 CAPSULE, LIQUID FILLED ORAL 2 TIMES DAILY
Status: DISCONTINUED | OUTPATIENT
Start: 2025-01-27 | End: 2025-01-29 | Stop reason: HOSPADM

## 2025-01-27 RX ORDER — CALCIUM CARBONATE 500 MG/1
1000 TABLET, CHEWABLE ORAL DAILY PRN
Status: DISCONTINUED | OUTPATIENT
Start: 2025-01-27 | End: 2025-01-29 | Stop reason: HOSPADM

## 2025-01-27 RX ORDER — OXYTOCIN/RINGER'S LACTATE 30/500 ML
1-30 PLASTIC BAG, INJECTION (ML) INTRAVENOUS
Status: DISCONTINUED | OUTPATIENT
Start: 2025-01-27 | End: 2025-01-27

## 2025-01-27 RX ADMIN — SODIUM CHLORIDE 2.5 MILLION UNITS: 9 INJECTION, SOLUTION INTRAVENOUS at 09:11

## 2025-01-27 RX ADMIN — SODIUM CHLORIDE 2.5 MILLION UNITS: 9 INJECTION, SOLUTION INTRAVENOUS at 01:05

## 2025-01-27 RX ADMIN — SODIUM CHLORIDE 2.5 MILLION UNITS: 9 INJECTION, SOLUTION INTRAVENOUS at 04:47

## 2025-01-27 RX ADMIN — SODIUM CHLORIDE, SODIUM LACTATE, POTASSIUM CHLORIDE, AND CALCIUM CHLORIDE 999 ML/HR: .6; .31; .03; .02 INJECTION, SOLUTION INTRAVENOUS at 10:57

## 2025-01-27 RX ADMIN — ROPIVACAINE HYDROCHLORIDE 10 ML/HR: 2 INJECTION, SOLUTION EPIDURAL; INFILTRATION at 10:54

## 2025-01-27 RX ADMIN — BENZOCAINE AND LEVOMENTHOL 1 APPLICATION: 200; 5 SPRAY TOPICAL at 18:57

## 2025-01-27 RX ADMIN — SODIUM CHLORIDE, SODIUM LACTATE, POTASSIUM CHLORIDE, AND CALCIUM CHLORIDE 125 ML/HR: .6; .31; .03; .02 INJECTION, SOLUTION INTRAVENOUS at 04:26

## 2025-01-27 RX ADMIN — IBUPROFEN 600 MG: 600 TABLET, FILM COATED ORAL at 16:13

## 2025-01-27 RX ADMIN — DOCUSATE SODIUM 100 MG: 100 CAPSULE, LIQUID FILLED ORAL at 18:28

## 2025-01-27 RX ADMIN — ROPIVACAINE HYDROCHLORIDE 10 ML: 2 INJECTION, SOLUTION EPIDURAL; INFILTRATION at 10:52

## 2025-01-27 RX ADMIN — ROPIVACAINE HYDROCHLORIDE: 2 INJECTION, SOLUTION EPIDURAL; INFILTRATION at 10:45

## 2025-01-27 RX ADMIN — Medication 2 MILLI-UNITS/MIN: at 04:04

## 2025-01-27 RX ADMIN — WITCH HAZEL 1 PAD: 500 SOLUTION RECTAL; TOPICAL at 18:57

## 2025-01-27 RX ADMIN — SODIUM CHLORIDE 2.5 MILLION UNITS: 9 INJECTION, SOLUTION INTRAVENOUS at 13:23

## 2025-01-27 RX ADMIN — ACETAMINOPHEN 975 MG: 325 TABLET, FILM COATED ORAL at 09:28

## 2025-01-27 NOTE — ANESTHESIA PREPROCEDURE EVALUATION
Procedure:  LABOR ANALGESIA    Relevant Problems   GYN   (+) 39 weeks gestation of pregnancy   (+) Cystic 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, antepartum      HEMATOLOGY   (+) Anemia affecting pregnancy in third trimester      NEURO/PSYCH   (+) Major depressive disorder        Physical Exam    Airway    Mallampati score: II         Dental       Cardiovascular  Rhythm: regular, Rate: normal    Pulmonary   Breath sounds clear to auscultation    Other Findings  post-pubertal.      Anesthesia Plan  ASA Score- 2     Anesthesia Type- epidural with ASA Monitors.         Additional Monitors:     Airway Plan:            Plan Factors-Exercise tolerance (METS): >4 METS.    Chart reviewed.   Existing labs reviewed. Patient summary reviewed.    Patient is not a current smoker.      There is medical exclusion for perioperative obstructive sleep apnea risk education.        Induction- intravenous.    Postoperative Plan-     Perioperative Resuscitation Plan - Level 1 - Full Code.       Informed Consent- Anesthetic plan and risks discussed with patient.        NPO Status:  Vitals Value Taken Time   Date of last liquid 01/26/25 01/26/25 2044   Time of last liquid 1900 01/26/25 2044   Date of last solid 01/26/25 01/26/25 2044   Time of last solid 1900 01/26/25 2044

## 2025-01-27 NOTE — L&D DELIVERY NOTE
OBGYN Vaginal Delivery Summary  Juli Fish 19 y.o. female MRN: [id number]  Unit/Bed#: -01 Encounter: [id number]    Predelivery Diagnosis:  1. Pregnancy at 39w1d   2. GBS positive  3. Cystic fibrosis carrier  4. Major depressive disorder    Postdelivery Diagnosis:  1. Same as above  2. Delivery of term     Procedure: spontaneous vaginal delivery, no laceration(s)    Attending: Dr. Pepe    Assistant: Dr. Mat Thomas    Anesthesia: Epidural    QBL: 32 mL  Admission Hg: 10.0 g/dL  Admission platelets: 225 thousands/uL    Complications: none apparent    Specimens: cord blood, arterial and venous cord blood gases, placenta to storage    Findings:   1. Viable male at 1542, with APGARS of 8 and 9 at 1 and 5 minutes respectively. Weight pending at time of dictation.  2. Spontaneous delivery of intact placenta at 1547. Centrally insertion of three vessel umbilical cord  3. No laceration  4. Blood gases:  Umbilical Cord Venous Blood Gas:  Results from last 7 days   Lab Units 25  1543   PH COV  7.308   PCO2 COV mm HG 38.6   HCO3 COV mmol/L 18.9   BASE EXC COV mmol/L -6.7*   O2 CT CD VB mL/dL 13.9   O2 HGB, VENOUS CORD % 59.9     Umbilical Cord Arterial Blood Gas:  Results from last 7 days   Lab Units 25  1543   PH COA  7.229*   PCO2 COA  53.0   PO2 COA mm HG 14.0   HCO3 COA mmol/L 21.6   BASE EXC COA mmol/L -6.6*   O2 CONTENT CORD ART ml/dl 6.5   O2 HGB, ARTERIAL CORD % 27.3       Disposition:  Patient tolerated the procedure well and was recovering in labor and delivery room.    Brief history and labor course:  Juli Fish is a 19 y.o.  at 39wk1d. She presented to labor and delivery for elective induction of labor, induction started with tejeda balloon placement and vaginal Cytotec, tejeda balloon was out, pitocin was started 4 hours after the cytotec was placed, patient received an epidural, AROM cl was performed, on fetal monitoring was noted some lates, patient was  checked and SVE 10/100/+2 and began pushing.     Description of procedure  After pushing for 40 minutes, the patient delivered a viable male  at 1542 on 2025, weight pending at time of dictation, apgars of 8 (1 min) and 9 (5 min). The fetal vertex delivered spontaneously. Baby restituted  to LOT. There was  no nuchal cord. The anterior (right) shoulder delivered atraumatically with maternal expulsive forces and the assistance of gentle downward traction. The posterior shoulder delivered with maternal expulsive forces and the assistance of gentle upward traction. The remainder of the fetus delivered spontaneously.     Upon delivery the infant was placed on the mother's abdomen and delayed cord clamping was performed. The umbilical cord was then doubly clamped and cut. The infant was noted to cry spontaneously and was moving all extremities appropriately. There was no evidence for injury. Awaiting nurse resuscitators evaluated the . Arterial and venous cord blood gases and cord blood were collected for analysis and were promptly sent to the lab. In the immediate post-partum, IV pitocin was administered, and the uterus was noted to contract down well with massage and pitocin. The placenta delivered spontaneously at 1547 and was noted to be intact and had a centrally inserted 3 vessel cord. The placenta was sent to storage.    The vagina, cervix, perineum, and rectum were inspected. No laceration(s) were noted.    At the conclusion of the procedure, all needle, sponge, and instrument counts were noted to be correct. Patient tolerated the procedure well and was allowed to recover in labor and delivery room with family and  before being transferred to the post-partum floor.     Dr. Pepe was present and participated in all key portions of the case.    Mat Wynne MD  2025  3:57 PM

## 2025-01-27 NOTE — ASSESSMENT & PLAN NOTE
Continue routine post partum care  Encourage ambulation  Encourage breastfeeding  Contraception: Depo  Anticipate discharge  PPD 1 vs 2

## 2025-01-27 NOTE — ANESTHESIA PROCEDURE NOTES
Epidural Block    Patient location during procedure: OB/L&D  Start time: 1/27/2025 10:45 AM  Reason for block: at surgeon's request  Staffing  Performed by: Rj Simons DO  Authorized by: Rj Simons DO    Preanesthetic Checklist  Completed: patient identified, IV checked, site marked, risks and benefits discussed, surgical consent, monitors and equipment checked, pre-op evaluation and timeout performed  Epidural  Patient position: sitting  Prep: Betadine  Sedation Level: no sedation  Patient monitoring: frequent blood pressure checks, heart rate and continuous pulse oximetry  Approach: midline  Location: lumbar, L2-3  Injection technique: ALEAH air  Needle  Needle type: Tuohy   Needle gauge: 18 G  Catheter type: side hole  Catheter size: 20 G  Catheter securement method: tape  Test dose: negativeropivacaine (NAROPIN) 0.2% injection 10 mL - Epidural   10 mL - 1/27/2025 10:52:00 AM  Assessment  Sensory level: T10  Number of attempts: 1negative aspiration for CSF, negative aspiration for heme and no paresthesia on injection  patient tolerated the procedure well with no immediate complications

## 2025-01-27 NOTE — PLAN OF CARE
Problem: Knowledge Deficit  Goal: Verbalizes understanding of labor plan  Description: Assess patient/family/caregiver's baseline knowledge level and ability to understand information.  Provide education via patient/family/caregiver's preferred learning method at appropriate level of understanding.     1. Provide teaching at level of understanding.  2. Provide teaching via preferred learning method(s).  Outcome: Progressing     Problem: Labor & Delivery  Goal: Manages discomfort  Description: Assess and monitor for signs and symptoms of discomfort.  Assess patient's pain level regularly and per hospital policy.  Administer medications as ordered. Support use of nonpharmacological methods to help control pain such as distraction, imagery, relaxation, and application of heat and cold.  Collaborate with interdisciplinary team and patient to determine appropriate pain management plan.    1. Include patient in decisions related to comfort.  2. Offer non-pharmacological pain management interventions.  3. Report ineffective pain management to physician.  Outcome: Progressing  Goal: Patient vital signs are stable  Description: 1. Assess vital signs - vaginal delivery.  Outcome: Progressing     Problem: ANTEPARTUM  Goal: Maintain pregnancy as long as maternal and/or fetal condition is stable  Description: INTERVENTIONS:  - Maternal surveillance  - Fetal surveillance  - Monitor uterine activity  - Medications as ordered  - Bedrest  Outcome: Progressing     Problem: BIRTH - VAGINAL/ SECTION  Goal: Fetal and maternal status remain reassuring during the birth process  Description: INTERVENTIONS:  - Monitor vital signs  - Monitor fetal heart rate  - Monitor uterine activity  - Monitor labor progression (vaginal delivery)  - DVT prophylaxis  - Antibiotic prophylaxis  Outcome: Progressing  Goal: Emotionally satisfying birthing experience for mother/fetus  Description: Interventions:  - Assess, plan, implement and evaluate  the nursing care given to the patient in labor  - Advocate the philosophy that each childbirth experience is a unique experience and support the family's chosen level of involvement and control during the labor process   - Actively participate in both the patient's and family's teaching of the birth process  - Consider cultural, Yarsani and age-specific factors and plan care for the patient in labor  Outcome: Progressing     Problem: POSTPARTUM  Goal: Experiences normal postpartum course  Description: INTERVENTIONS:  - Monitor maternal vital signs  - Assess uterine involution and lochia  Outcome: Progressing  Goal: Appropriate maternal -  bonding  Description: INTERVENTIONS:  - Identify family support  - Assess for appropriate maternal/infant bonding   -Encourage maternal/infant bonding opportunities  - Referral to  or  as needed  Outcome: Progressing  Goal: Establishment of infant feeding pattern  Description: INTERVENTIONS:  - Assess breast/bottle feeding  - Refer to lactation as needed  Outcome: Progressing  Goal: Incision(s), wounds(s) or drain site(s) healing without S/S of infection  Description: INTERVENTIONS  - Assess and document dressing, incision, wound bed, drain sites and surrounding tissue  - Provide patient and family education  - Perform skin care/dressing changes every   Outcome: Progressing

## 2025-01-27 NOTE — ANESTHESIA POSTPROCEDURE EVALUATION
Post-Op Assessment Note    CV Status:  Stable  Pain Score: 0    Pain management: adequate       Mental Status:  Alert and awake   Hydration Status:  Euvolemic   PONV Controlled:  Controlled   Airway Patency:  Patent     Post Op Vitals Reviewed: Yes    No anethesia notable event occurred.    Staff: Anesthesiologist           Last Filed PACU Vitals:  Vitals Value Taken Time   Temp     Pulse 86 01/27/25 1743   /59 01/27/25 1743   Resp     SpO2

## 2025-01-27 NOTE — OB LABOR/OXYTOCIN SAFETY PROGRESS
Labor Progress Note - Juli Fish 19 y.o. female MRN: [id number]    Unit/Bed#: -01 Encounter: [id number]       Contraction Frequency (minutes): 2-3  Contraction Intensity: Mild  Uterine Activity Characteristics: Irregular  Cervical Dilation: 4-5        Cervical Effacement: 80  Fetal Station: -2  Baseline Rate (FHR): 150 bpm  Fetal Heart Rate (FHT): 150 BPM  FHR Category: I               Vital Signs:   Vitals:    01/27/25 0006   BP: 126/78   Pulse: 85   Resp:    Temp:    SpO2:        Notes/comments:   Patient called out with increased pain. FB expelled with gentle traction. SVE as above. Plan to start pitocin at 0225, which is 4 hours after cytotec depending on uterine activity at that time. She is currently every 1-3 minutes currently.        Morenita Sherman MD 1/27/2025 1:49 AM

## 2025-01-27 NOTE — OB LABOR/OXYTOCIN SAFETY PROGRESS
Labor Progress Note - Juli Fish 19 y.o. female MRN: [id number]    Unit/Bed#: -01 Encounter: [id number]    Dose (gaudencio-units/min) Oxytocin: 0 gaudencio-units/min (hold per dr okeefe)  Contraction Frequency (minutes): 1-2  Contraction Intensity: Mild  Uterine Activity Characteristics: Irregular  Cervical Dilation: 4-5        Cervical Effacement: 80  Fetal Station: -2  Baseline Rate (FHR): 160 bpm  Fetal Heart Rate (FHT): 150 BPM  FHR Category: I               Vital Signs:   Vitals:    01/27/25 0312   BP: 120/75   Pulse: 86   Resp:    Temp:    SpO2:        Notes/comments:   Patient is feeling well at this time.  She describes her contractions as mild.  FHT category 1.  SVE unchanged.  Plan to start Pitocin.    Morenita Okeefe MD 1/27/2025 3:58 AM

## 2025-01-27 NOTE — DISCHARGE SUMMARY
Discharge Summary - OB/GYN   Name: Juli Fish 19 y.o. female I MRN: [id number]  Unit/Bed#: -01 I Date of Admission: 2025   Date of Service: 2025 I Hospital Day: 3    Obstetrics Discharge Summary  Juli Fish 19 y.o. female MRN: [id number]  Unit/Bed#: -01 Encounter: [id number]    Admission Date: 2025     Discharge Date: 2025    Admitting Attending: Dr. Hankins  Delivery Attending: Dr. Pepe  Discharging Attending: Dr. Pepe    Admitting Diagnoses:   1. Pregnancy at 39w1d   2. GBS positive  3. Cystic fibrosis carrier  4. Major depressive disorder    Discharge Diagnoses:   1. Same as above  2. Delivery of term     Delivery  Route of Delivery: Vaginal, Spontaneous    Anesthesia: Epidural,   QBL: 32 ml    Delivery: Vaginal, Spontaneous at 2025 3:42 PM  No Laceration    Baby's Weight: 3030 g (6 lb 10.9 oz); 106.88    Apgar scores: 8  and 9  at 1 and 5 minutes, respectively      Hospital course: Juli Fish is now a 19 y.o.  at 39w0d. She presented to labor and delivery for elective induction of labor, induction started with tejeda balloon placement and vaginal Cytotec, tejeda balloon was out, pitocin was started 4 hours after the cytotec was placed, patient received an epidural, AROM cl was performed, on fetal monitoring was noted some lates, patient was checked and SVE 10/100/+2 and began pushing.     Her post-delivery course was uncomplicated. Her postpartum pain was well controlled with oral analgesics. Maternal blood type is O positive so RhoGAM wasn't indicated.    On day of discharge, she was ambulating and able to reasonably perform all ADLs. She was voiding and had appropriate bowel function. Pain was well controlled. She was discharged home on postpartum day #2 without complications. Patient was instructed to follow up with her OBGYN as an outpatient and was given appropriate warnings to call provider if she develops signs of  infection or uncontrolled pain.    Complications: none apparent    Condition at discharge: good     Provisions for Follow-Up Care:  Please see after visit summary for information related to follow-up care and any pertinent home health orders.      Disposition: Home    Planned Readmission: No    Discharge Medications:   Please see AVS for a complete list of discharge medications.    Discharge instructions :   -Do not place anything (no partner, tampons or douche) in your vagina for 6 weeks  -You may walk for exercise for the first 6 weeks then gradually return to your usual activities   -Please do not drive for 1 week if you have no stitches and for 2 weeks if you have stitches    -You may take baths or shower per your preference   -Please examine your breasts in the mirror daily and call your doctor for redness or tenderness or increased warmth    -Please call your doctor's office if temperature > 100.4*F or 38* C, worsening pain or a foul discharge.    Mat Thomas MD  OB GYN PGY1  02/01/25  10:22 AM

## 2025-01-27 NOTE — OB LABOR/OXYTOCIN SAFETY PROGRESS
Oxytocin Safety Progress Check Note - Juli Fish 19 y.o. female MRN: [id number]    Unit/Bed#: -01 Encounter: [id number]    Dose (gaudencio-units/min) Oxytocin: 4 gaudencio-units/min  Contraction Frequency (minutes): 3.5  Contraction Intensity: Mild  Uterine Activity Characteristics: Regular  Cervical Dilation: 4-5        Cervical Effacement: 80  Fetal Station: -1  Baseline Rate (FHR): 130 bpm  Fetal Heart Rate (FHT): 150 BPM  FHR Category: cat 1  Oxytocin Safety Progress Check: Safety check completed            Vital Signs:   Vitals:    01/27/25 1154   BP: 111/58   Pulse: 80   Resp:    Temp:    SpO2:        Notes/comments:     Patient feeling much more comfortable with epidural. SVE as above. IUPC placed for better pitocin titration. Continue position changes    Lis Horton DO 1/27/2025 12:05 PM

## 2025-01-27 NOTE — OB LABOR/OXYTOCIN SAFETY PROGRESS
Oxytocin Safety Progress Check Note - Juli Fish 19 y.o. female MRN: [id number]    Unit/Bed#: -01 Encounter: [id number]    Dose (gaudencio-units/min) Oxytocin: 6 gaudencio-units/min  Contraction Frequency (minutes): 2-3  Contraction Intensity: Mild  Uterine Activity Characteristics: Regular  Cervical Dilation: 4-5        Cervical Effacement: 80  Fetal Station: -1  Baseline Rate (FHR): 135 bpm  Fetal Heart Rate (FHT): 150 BPM  FHR Category: 1  Oxytocin Safety Progress Check: Safety check completed            Vital Signs:   Vitals:    01/27/25 0730   BP: 119/66   Pulse:    Resp:    Temp:    SpO2:        Notes/comments:   Pt comfortable using birthing ball.   Good candidate for AROM. Pt to consider, use rest room. Will readdress soon.       Danish Pepe MD 1/27/2025 8:28 AM    Addendum: AROM done at 0839, clear fluid noted.

## 2025-01-27 NOTE — PLAN OF CARE
Problem: Knowledge Deficit  Goal: Verbalizes understanding of labor plan  Description: Assess patient/family/caregiver's baseline knowledge level and ability to understand information.  Provide education via patient/family/caregiver's preferred learning method at appropriate level of understanding.     1. Provide teaching at level of understanding.  2. Provide teaching via preferred learning method(s).  Outcome: Progressing     Problem: Labor & Delivery  Goal: Manages discomfort  Description: Assess and monitor for signs and symptoms of discomfort.  Assess patient's pain level regularly and per hospital policy.  Administer medications as ordered. Support use of nonpharmacological methods to help control pain such as distraction, imagery, relaxation, and application of heat and cold.  Collaborate with interdisciplinary team and patient to determine appropriate pain management plan.    1. Include patient in decisions related to comfort.  2. Offer non-pharmacological pain management interventions.  3. Report ineffective pain management to physician.  Outcome: Progressing  Goal: Patient vital signs are stable  Description: 1. Assess vital signs - vaginal delivery.  Outcome: Progressing     Problem: ANTEPARTUM  Goal: Maintain pregnancy as long as maternal and/or fetal condition is stable  Description: INTERVENTIONS:  - Maternal surveillance  - Fetal surveillance  - Monitor uterine activity  - Medications as ordered  - Bedrest  Outcome: Progressing     Problem: BIRTH - VAGINAL/ SECTION  Goal: Fetal and maternal status remain reassuring during the birth process  Description: INTERVENTIONS:  - Monitor vital signs  - Monitor fetal heart rate  - Monitor uterine activity  - Monitor labor progression (vaginal delivery)  - DVT prophylaxis  - Antibiotic prophylaxis  Outcome: Progressing  Goal: Emotionally satisfying birthing experience for mother/fetus  Description: Interventions:  - Assess, plan, implement and evaluate  the nursing care given to the patient in labor  - Advocate the philosophy that each childbirth experience is a unique experience and support the family's chosen level of involvement and control during the labor process   - Actively participate in both the patient's and family's teaching of the birth process  - Consider cultural, Yarsanism and age-specific factors and plan care for the patient in labor  Outcome: Progressing     Problem: POSTPARTUM  Goal: Experiences normal postpartum course  Description: INTERVENTIONS:  - Monitor maternal vital signs  - Assess uterine involution and lochia  Outcome: Progressing  Goal: Appropriate maternal -  bonding  Description: INTERVENTIONS:  - Identify family support  - Assess for appropriate maternal/infant bonding   -Encourage maternal/infant bonding opportunities  - Referral to  or  as needed  Outcome: Progressing  Goal: Establishment of infant feeding pattern  Description: INTERVENTIONS:  - Assess breast/bottle feeding  - Refer to lactation as needed  Outcome: Progressing  Goal: Incision(s), wounds(s) or drain site(s) healing without S/S of infection  Description: INTERVENTIONS  - Assess and document dressing, incision, wound bed, drain sites and surrounding tissue  - Provide patient and family education  Outcome: Progressing

## 2025-01-27 NOTE — OB LABOR/OXYTOCIN SAFETY PROGRESS
Labor Progress Note - Juli Fish 19 y.o. female MRN: [id number]    Unit/Bed#: -01 Encounter: [id number]       Contraction Frequency (minutes): 4-6  Contraction Intensity: Mild  Uterine Activity Characteristics: Irregular  Cervical Dilation: 1        Cervical Effacement: 50  Fetal Station: -3  Baseline Rate (FHR): 140 bpm  Fetal Heart Rate (FHT): 150 BPM  FHR Category: I               Vital Signs:   Vitals:    01/26/25 2235   BP: 119/66   Pulse: 96   Resp:    Temp:    SpO2:        Notes/comments:   PROCEDURE:  KURTZ BALLOON PLACEMENT    A 24F kurtz with a 30cc balloon was selected. SVE was performed and cervix was located. Kurtz was introduced over sterile gloved hands. Balloon advanced through cervix beyond the internal cervical os. A small amount amount of sterile saline solution was instilled in the balloon to confirm placement. Placement was confirmed to be beyond the internal cervical os. A total of 60cc of sterile saline solution was placed into the balloon. Pt tolerated well. Instructions left with RN to place kurtz to gentle traction. Notify MD when kurtz dislodged. Vaginal cytotec placed.            Morenita Sherman MD 1/26/2025 10:49 PM

## 2025-01-27 NOTE — H&P
H & P- Obstetrics   Juli Fish 19 y.o. female MRN: [id number]  Unit/Bed#: -01 Encounter: [id number]    Assessment: 19 y.o.  at 39w0d admitted for elective induction of labor.  SVE: /-3  FHT: reactive  Clinical EFW: 6 lbs ; Cephalic confirmed by TAUS  GBS status: positive   Postpartum contraception plan: depo injection     Plan:   Group beta Strep positive  Assessment & Plan  PCN for GBS ppx of the      Anemia affecting pregnancy in third trimester  Assessment & Plan  S/p oral iron supplementation   Admission Hgb 10.0    Cystic 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, antepartum  Assessment & Plan  Heterozygous carrier  Partner did not complete testing    39 weeks gestation of pregnancy  Assessment & Plan  Admit to OBGYN   Clear liquid diet   F/u T&S, CBC, RPR   IVF LR 125cc/hr   Continuous fetal monitoring and tocometry   Analgesia at maternal request   Vertex by TAUS  Induction plan tejeda balloon and cytotec      Major depressive disorder  Assessment & Plan  Not on medication   F/U EPDS and monitor closely for postpartum depression           Discussed case and plan w/ Dr. Hankins      Chief Complaint: none    HPI: Juli Fish is a 19 y.o.  with an JESUS of 2025, by Last Menstrual Period at 39w0d who is being admitted for elective induction of labor. She denies having uterine contractions, has no LOF, and reports no VB. She states she has felt good FM.    Patient Active Problem List   Diagnosis    Spinal curvature    Screening for HIV (human immunodeficiency virus)    Underweight    History of suicidal ideation    Major depressive disorder    Possible exposure to STD    39 weeks gestation of pregnancy    Cystic 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, antepartum    Anemia affecting pregnancy in third trimester    Status post fall    Pelvic pain affecting pregnancy in third trimester, antepartum    Group beta Strep positive    Encounter for elective induction of labor        Baby complications/comments: none    Review of Systems   Constitutional:  Negative for chills and fever.   HENT:  Negative for congestion, rhinorrhea, sneezing and sore throat.    Eyes:  Negative for photophobia and visual disturbance.   Respiratory:  Negative for cough and shortness of breath.    Cardiovascular:  Negative for chest pain.   Gastrointestinal:  Negative for diarrhea, nausea and vomiting.   Genitourinary:  Negative for dysuria and frequency.   Neurological:  Negative for dizziness, numbness and headaches.   Psychiatric/Behavioral: Negative.         OB Hx:  OB History    Para Term  AB Living   1 0 0 0 0 0   SAB IAB Ectopic Multiple Live Births   0 0 0 0 0      # Outcome Date GA Lbr Sameer/2nd Weight Sex Type Anes PTL Lv   1 Current                Past Medical Hx:  Past Medical History:   Diagnosis Date    Anxiety     Chlamydia 2022    Depression     saw a therapist before    Dizziness     Headache     HEADACHES    Lyme disease     Suicide attempt (HCC) 03/10/2021    Being seen by [facility]. Diagnosed with depression. Taking Alcak[place] 420mg?        Past Surgical hx:  History reviewed. No pertinent surgical history.    Social Hx:  Alcohol use: denies  Tobacco use: denies  Other substance use: denies    Allergies   Allergen Reactions    Shellfish-Derived Products - Food Allergy Itching     Throat bothers a bit         Medications Prior to Admission:     ferrous sulfate 324 (65 Fe) mg    Prenatal Vit-Fe Fumarate-FA (prenatal vitamin) 28-0.8 mg    docusate sodium (COLACE) 100 mg capsule    hydrocortisone (ANUSOL-HC) 2.5 % rectal cream    Objective:  Temp:  [97.7 °F (36.5 °C)] 97.7 °F (36.5 °C)  HR:  [] 82  BP: (119-131)/(66-80) 131/66  Resp:  [19] 19  SpO2:  [100 %] 100 %  Body mass index is 26.04 kg/m².     Physical Exam:  Physical Exam  Constitutional:       General: She is not in acute distress.     Appearance: Normal appearance. She is not ill-appearing.   HENT:      Head:  Normocephalic and atraumatic.      Mouth/Throat:      Mouth: Mucous membranes are moist. No oral lesions.   Eyes:      General: No scleral icterus.     Extraocular Movements: Extraocular movements intact.   Cardiovascular:      Rate and Rhythm: Normal rate and regular rhythm.      Pulses: Normal pulses.   Pulmonary:      Effort: Pulmonary effort is normal. No respiratory distress.   Musculoskeletal:      Right lower leg: No edema.      Left lower leg: No edema.   Neurological:      General: No focal deficit present.      Mental Status: She is alert.   Skin:     General: Skin is warm and dry.   Psychiatric:         Attention and Perception: Attention normal.         Mood and Affect: Mood normal.         Speech: Speech normal.         Behavior: Behavior normal.         Thought Content: Thought content normal.         Judgment: Judgment normal.            FHT:  Baseline Rate (FHR): 140 bpm  Variability: Moderate  Accelerations: 15 x 15 or greater  Decelerations: None    TOCO:   No contractions    Lab Results   Component Value Date    WBC 8.91 01/26/2025    HGB 10.0 (L) 01/26/2025    HCT 32.6 (L) 01/26/2025     01/26/2025     Lab Results   Component Value Date    K 4.1 09/27/2023     09/27/2023    CO2 26 09/27/2023    BUN 9 09/27/2023    CREATININE 0.67 09/27/2023    AST 16 09/27/2023    ALT 10 09/27/2023     Prenatal Labs: Reviewed      Blood type: O pos  Antibody: neg  GBS: pos  HIV: NR  Rubella: immune  Syphilis IgM/IgG: NR  HBsAg: NR  HCAb: NR  Chlamydia: neg  Gonorrhea: neg  Diabetes 1 hour screen: wnl  3 hour glucose: n/a  Platelets: 225  Hgb: 10.0  >2 Midnights  INPATIENT     Signature/Title: Morenita Sherman MD  Date: 1/27/2025  Time: 12:54 AM

## 2025-01-28 PROBLEM — Z30.013 ENCOUNTER FOR INITIAL PRESCRIPTION OF INJECTABLE CONTRACEPTIVE: Status: ACTIVE | Noted: 2025-01-28

## 2025-01-28 LAB
DME PARACHUTE DELIVERY DATE ACTUAL: NORMAL
DME PARACHUTE DELIVERY DATE REQUESTED: NORMAL
DME PARACHUTE ITEM DESCRIPTION: NORMAL
DME PARACHUTE ORDER STATUS: NORMAL
DME PARACHUTE SUPPLIER NAME: NORMAL
DME PARACHUTE SUPPLIER PHONE: NORMAL

## 2025-01-28 PROCEDURE — 99024 POSTOP FOLLOW-UP VISIT: CPT | Performed by: OBSTETRICS & GYNECOLOGY

## 2025-01-28 RX ORDER — MEDROXYPROGESTERONE ACETATE 150 MG/ML
150 INJECTION, SUSPENSION INTRAMUSCULAR ONCE
Status: COMPLETED | OUTPATIENT
Start: 2025-01-28 | End: 2025-01-29

## 2025-01-28 RX ADMIN — ACETAMINOPHEN 650 MG: 325 TABLET, FILM COATED ORAL at 08:09

## 2025-01-28 RX ADMIN — IBUPROFEN 600 MG: 600 TABLET, FILM COATED ORAL at 22:51

## 2025-01-28 RX ADMIN — IBUPROFEN 600 MG: 600 TABLET, FILM COATED ORAL at 17:11

## 2025-01-28 RX ADMIN — IBUPROFEN 600 MG: 600 TABLET, FILM COATED ORAL at 08:09

## 2025-01-28 NOTE — LACTATION NOTE
This note was copied from a baby's chart.  CONSULT - LACTATION  Baby Boy (Cricket Fish 1 days male MRN: [id number]    [facility] NURSERY Room / Bed: (N)/(N) Encounter: [id number]    Maternal Information     MOTHER:  Juli Fish  Maternal Age: 19 y.o.  OB History: # 1 - Date: 25, Sex: Male, Weight: 3030 g (6 lb 10.9 oz), GA: 39w1d, Type: Vaginal, Spontaneous, Apgar1: 8, Apgar5: 9, Living: Living, Birth Comments: None   Previous breast reduction surgery? No    Lactation history:   Has patient previously breast fed: No   How long had patient previously breast fed:     Previous breast feeding complications:     History reviewed. No pertinent surgical history.    Birth information:  YOB: 2026   Time of birth: 3:42 PM   Sex: male   Delivery type: Vaginal, Spontaneous   Birth Weight: 3030 g (6 lb 10.9 oz)   Percent of Weight Change: -1%     Gestational Age: 39w1d   [unfilled]    Reason for Consult:    Reason for Consult: Initial assessment (ext) - 20 min    Breast and nipple assessment:   Breasts/Nipples  Breastfeeding Progress: Not yet established, Breastfeeding well    Breast Pump:    Breast Pump  Pump: Personal (Placed CM consult for Spectra S2.)     Assessment:  Sleeping, not assessed.    Feeding assessment: feeding well, per patient. No latch assessed. Encourage to call for latch check.    Feeding recommendations:  breast feed on demand every 2-3 hours, watching for early feeding cues. At least 8-12 feedings in 24 hours.    Consulted with patient, for an initial visit. Juli reports that breastfeeding is going well. Patient states that baby is latching with no difficulty, states that she feels a strong but comfortable tugging sensation when baby is sucking. Patient reports to be breastfeeding every 2-3 hours and offering both breasts at a feed.    Feeding plan:  Patient is encouraged to breastfeed on demand, every 2-3 hours or  when baby showing early feeding cues.  Discussed the importance of ensuring that baby feeds 8-12x in 24 hours and not waiting over 3 hours to feed.   Reviewed to offer both breasts during a feed.    Discussed fullness cues vs hunger cues.  Provided with verbal guidance on how to latch and position baby at the breast. Patient states she has been breastfeeding in the football and cross cradle position.    Instructed with patient to ensure that baby is always facing mother and that baby is alignment- ears, shoulders and hips. Encouraged to support the base of the baby's neck, avoiding holding the back of the baby's head to allow the baby to move as they need to.     Discussed that initial latch can be painful at first but should wear off and should not be continuous throughout the feeding. Discussed that it feels like baby is pinching at the breast, encourage to break the suction by putting pinky in the corner of the baby's mouth and relatching the baby.    Discussed that colostrum is thick and sticky and comes in small amounts in the first few days. Reviewed that breast milk will start to transition day 3-5.    Discussed second day syndrome. Reviewed baby's belly size and cluster feeding. Discussed cluster feeding is a normal baby behavior and helps bring in a good milk supply and helps prevent excessive weight loss.     Discussed how to know the baby is feeding adequately at the breast:  -Baby is having at least 8-12 feeds in 24 hours.  -Baby is deeply latched onto the breast, with lips flanged out. Actively sucking, swallows may not be audible.  -Mother feels a comfortable tugging sensation during a feeding.  -Baby is showing fullness cues post feed- content, arms relaxed and/or sleeping.  -Baby is having adequate amounts of diapers and meeting goal diapers.  -Baby's stool is changing from black meconium, to greenish brown to yellow and seedy looking over the first week when exclusively providing breast milk.    -Baby's weight loss is within normal ranges.     Discussed use of feeding log. Patient was encouraged to continue to document baby's feedings and outputs to ensure baby is adequately feeding at the breast.    Discussed community resources for continued breastfeeding support once discharged home. Encouraged to contact with [facility] as needed.    Placed CM consult for Spectra S2 breast pump.    Patient was encouraged to contact lactation for a latch assessment, continued support and/or questions that arise.     SADIQ Perez 1/28/2025 12:53 PM

## 2025-01-28 NOTE — LACTATION NOTE
This note was copied from a baby's chart.  CONSULT - LACTATION  Baby Boy (Cricket Fish 1 days male MRN: [id number]    [facility] NURSERY Room / Bed: (N)/(N) Encounter: [id number]    Maternal Information     MOTHER:  Juli Fish  Maternal Age: 19 y.o.  OB History: # 1 - Date: 01/27/25, Sex: Male, Weight: 3030 g (6 lb 10.9 oz), GA: 39w1d, Type: Vaginal, Spontaneous, Apgar1: 8, Apgar5: 9, Living: Living, Birth Comments: None   Previous breast reduction surgery? No    Lactation history:   Has patient previously breast fed: No   How long had patient previously breast fed:     Previous breast feeding complications:     History reviewed. No pertinent surgical history.    Birth information:  YOB: 2026   Time of birth: 3:42 PM   Sex: male   Delivery type: Vaginal, Spontaneous   Birth Weight: 3030 g (6 lb 10.9 oz)   Percent of Weight Change: -1%     Gestational Age: 39w1d   [unfilled]    Reason for Consult:    Reason for Consult: Follow-up assessment (routine) -10 min, Pump Setup - 15 min    Risk Factors:     Early supplementation    Breast and nipple assessment:   Breasts/Nipples  Date Pumping Initiated: 01/28/25  Time Pumping Initiated: 1400  Left Breast: Soft  Right Breast: Soft  Left Nipple: Everted  Right Nipple: Everted  Intervention: Breast pump  Breastfeeding Progress: Not yet established, Breastfeeding well    OB Lactation Tools:    Other OB Lactation Tools  Feeding Devices: Bottle (10 ml sim)    Breast Pump:    Breast Pump  Pump: Manual, Electric  Pump Review/Education: Setup, frequency, and cleaning, Milk storage  Initiated by: SADIQ Ross  Date Initiated: 01/28/25    Feeding assessment: feeding well    Feeding recommendations:  breast feed on demand pump if baby getting supplemented with formula.    Family requested lactation to room to help Juli set up to pump. Grandmother and FOB report that Juli is tired and should bottle  feed formula instead of breastfeeding for the next feed. Juli states that the baby is too sleepy to latch. Offer to help wake baby and latch baby at the breast. Juli expressed interest to pump for 15 minutes instead of bringing baby to breast at this time.    Educated and demonstrated with family on paced bottle feed. Grandmother already had a bottle prepared during consult to give to the baby. Discussed appropriate amounts to offer baby according to baby's belly size. Grandmother fed baby 10 ml of formula.  Discussed risks of early supplementation with family- delay gastric emptying for baby, baby may become less interested in breast feeding, decrease in milk supply.  Educated on pumping to protect/establish milk supply.    Cycled breast pump with patient.  Demonstrated hospital grade DEBP. Verbal and printed instructions given on pumping.  Discussed duration, frequency, set up and different modes of the pump.  Reviewed supplies of pump, including assembly- placement of flanges and sizing of flanges.  Demonstrated use of hand pump.  Discussed labeling and storage of breast milk. Provided with [facility] breast milk storage guidelines.  Discussed cleaning of breast pump parts.  Discussed how to collect colostrum.    Discussed that colostrum is thick and sticky and comes in small amounts in the first few days. Discussed with patient to not get discouraged if only small amounts of breast milk is visible. Reviewed that breast milk will start to transition day 3-5.  Educated patient that baby is more efficient and effective at removing colostrum at the breast. Reiterated that pumping colostrum is not a good indicator of how much the baby is feeding from the breast.     Feeding plan:  Patient is encouraged to breastfeed on demand, every 2-3 hours or when baby showing early feeding cues.  Discussed the importance of ensuring that baby feeds 8-12x in 24 hours and not waiting over 3 hours to feed.   Offer both breasts  during a feed.  Pump if baby is getting supplemented with formula.    Patient was encouraged to contact lactation for a latch assessment, continued support and/or questions that arise.      Rahel Frances 1/28/2025 2:28 PM

## 2025-01-28 NOTE — CASE MANAGEMENT
Case Management Progress Note    Patient name Juli Fish  Location /-01 MRN [id number]  : 2005 Date 2025       LOS (days): 2  Geometric Mean LOS (GMLOS) (days):   Days to GMLOS:        OBJECTIVE:        Current admission status: Inpatient  Preferred Pharmacy:   [id number] - [place] - [address]  [address] [address]  Phone: [phone] Fax: [phone]    Primary Care Provider: Sanna Amrbocio DO    Primary Insurance: [employer]  Secondary Insurance:     PROGRESS NOTE:    CM received consult for breast pump. MOB requesting Spectra S2 pump. CM placed order via [employer]. Order approved. CM delivered pump to MOB's room. Delivery ticket signed and placed in bin for DME liaison.

## 2025-01-28 NOTE — PLAN OF CARE
Problem: PAIN - ADULT  Goal: Verbalizes/displays adequate comfort level or baseline comfort level  Description: Interventions:  - Encourage patient to monitor pain and request assistance  - Assess pain using appropriate pain scale  - Administer analgesics based on type and severity of pain and evaluate response  - Implement non-pharmacological measures as appropriate and evaluate response  - Consider cultural and social influences on pain and pain management  - Notify physician/advanced practitioner if interventions unsuccessful or patient reports new pain  Outcome: Progressing     Problem: INFECTION - ADULT  Goal: Absence or prevention of progression during hospitalization  Description: INTERVENTIONS:  - Assess and monitor for signs and symptoms of infection  - Monitor lab/diagnostic results  - Monitor all insertion sites, i.e. indwelling lines, tubes, and drains  - Monitor endotracheal if appropriate and nasal secretions for changes in amount and color  - [place] appropriate cooling/warming therapies per order  - Administer medications as ordered  - Instruct and encourage patient and family to use good hand hygiene technique  - Identify and instruct in appropriate isolation precautions for identified infection/condition  Outcome: Progressing  Goal: Absence of fever/infection during neutropenic period  Description: INTERVENTIONS:  - Monitor WBC    Outcome: Progressing     Problem: SAFETY ADULT  Goal: Patient will remain free of falls  Description: INTERVENTIONS:  - Educate patient/family on patient safety including physical limitations  - Instruct patient to call for assistance with activity   - Consult OT/PT to assist with strengthening/mobility   - Keep Call bell within reach  - Keep bed low and locked with side rails adjusted as appropriate  - Keep care items and personal belongings within reach  - Initiate and maintain comfort rounds  Outcome: Progressing  Goal: Maintain or return to baseline ADL  function  Description: INTERVENTIONS:  -  Assess patient's ability to carry out ADLs; assess patient's baseline for ADL function and identify physical deficits which impact ability to perform ADLs (bathing, care of mouth/teeth, toileting, grooming, dressing, etc.)  - Assess/evaluate cause of self-care deficits   - Assess range of motion  - Assess patient's mobility; develop plan if impaired  - Assess patient's need for assistive devices and provide as appropriate  - Encourage maximum independence but intervene and supervise when necessary  - Involve family in performance of ADLs  - Assess for home care needs following discharge   - Consider OT consult to assist with ADL evaluation and planning for discharge  - Provide patient education as appropriate  Outcome: Progressing  Goal: Maintains/Returns to pre admission functional level  Description: INTERVENTIONS:  - Perform AM-PAC 6 Click Basic Mobility/ Daily Activity assessment daily.  - Set and communicate daily mobility goal to care team and patient/family/caregiver.   - Collaborate with rehabilitation services on mobility goals if consulted  Outcome: Progressing     Problem: Knowledge Deficit  Goal: Patient/family/caregiver demonstrates understanding of disease process, treatment plan, medications, and discharge instructions  Description: Complete learning assessment and assess knowledge base.  Interventions:  - Provide teaching at level of understanding  - Provide teaching via preferred learning methods  Outcome: Progressing     Problem: DISCHARGE PLANNING  Goal: Discharge to home or other facility with appropriate resources  Description: INTERVENTIONS:  - Identify barriers to discharge w/patient and caregiver  - Arrange for needed discharge resources and transportation as appropriate  - Identify discharge learning needs (meds, wound care, etc.)  - Arrange for interpretive services to assist at discharge as needed  - Refer to Case Management Department for  coordinating discharge planning if the patient needs post-hospital services based on physician/advanced practitioner order or complex needs related to functional status, cognitive ability, or social support system  Outcome: Progressing

## 2025-01-28 NOTE — PLAN OF CARE
Problem: PAIN - ADULT  Goal: Verbalizes/displays adequate comfort level or baseline comfort level  Description: Interventions:  - Encourage patient to monitor pain and request assistance  - Assess pain using appropriate pain scale  - Administer analgesics based on type and severity of pain and evaluate response  - Implement non-pharmacological measures as appropriate and evaluate response  - Consider cultural and social influences on pain and pain management  - Notify physician/advanced practitioner if interventions unsuccessful or patient reports new pain  Outcome: Progressing     Problem: INFECTION - ADULT  Goal: Absence or prevention of progression during hospitalization  Description: INTERVENTIONS:  - Assess and monitor for signs and symptoms of infection  - Monitor lab/diagnostic results  - Monitor all insertion sites, i.e. indwelling lines, tubes, and drains  - Monitor endotracheal if appropriate and nasal secretions for changes in amount and color  - [place] appropriate cooling/warming therapies per order  - Administer medications as ordered  - Instruct and encourage patient and family to use good hand hygiene technique  - Identify and instruct in appropriate isolation precautions for identified infection/condition  Outcome: Progressing  Goal: Absence of fever/infection during neutropenic period  Description: INTERVENTIONS:  - Monitor WBC    Outcome: Progressing     Problem: SAFETY ADULT  Goal: Patient will remain free of falls  Description: INTERVENTIONS:  - Educate patient/family on patient safety including physical limitations  - Instruct patient to call for assistance with activity   - Consult OT/PT to assist with strengthening/mobility   - Keep Call bell within reach  - Keep bed low and locked with side rails adjusted as appropriate  - Keep care items and personal belongings within reach  - Initiate and maintain comfort rounds  - Make Fall Risk Sign visible to staff  - Apply yellow socks and bracelet  for high fall risk patients  - Consider moving patient to room near nurses station  Outcome: Progressing  Goal: Maintain or return to baseline ADL function  Description: INTERVENTIONS:  -  Assess patient's ability to carry out ADLs; assess patient's baseline for ADL function and identify physical deficits which impact ability to perform ADLs (bathing, care of mouth/teeth, toileting, grooming, dressing, etc.)  - Assess/evaluate cause of self-care deficits   - Assess range of motion  - Assess patient's mobility; develop plan if impaired  - Assess patient's need for assistive devices and provide as appropriate  - Encourage maximum independence but intervene and supervise when necessary  - Involve family in performance of ADLs  - Assess for home care needs following discharge   - Consider OT consult to assist with ADL evaluation and planning for discharge  - Provide patient education as appropriate  Outcome: Progressing  Goal: Maintains/Returns to pre admission functional level  Description: INTERVENTIONS:  - Perform AM-PAC 6 Click Basic Mobility/ Daily Activity assessment daily.  - Set and communicate daily mobility goal to care team and patient/family/caregiver.   - Collaborate with rehabilitation services on mobility goals if consulted  - Out of bed for toileting  - Record patient progress and toleration of activity level   Outcome: Progressing     Problem: Knowledge Deficit  Goal: Patient/family/caregiver demonstrates understanding of disease process, treatment plan, medications, and discharge instructions  Description: Complete learning assessment and assess knowledge base.  Interventions:  - Provide teaching at level of understanding  - Provide teaching via preferred learning methods  Outcome: Progressing     Problem: DISCHARGE PLANNING  Goal: Discharge to home or other facility with appropriate resources  Description: INTERVENTIONS:  - Identify barriers to discharge w/patient and caregiver  - Arrange for needed  discharge resources and transportation as appropriate  - Identify discharge learning needs (meds, wound care, etc.)  - Arrange for interpretive services to assist at discharge as needed  - Refer to Case Management Department for coordinating discharge planning if the patient needs post-hospital services based on physician/advanced practitioner order or complex needs related to functional status, cognitive ability, or social support system  Outcome: Progressing

## 2025-01-28 NOTE — CASE MANAGEMENT
Case Management Progress Note    Patient name Juli Fish  Location /-01 MRN [id number]  : 2005 Date 2025       LOS (days): 2  Geometric Mean LOS (GMLOS) (days):   Days to GMLOS:        OBJECTIVE:        Current admission status: Inpatient  Preferred Pharmacy:   [id number] [place] - [address]  [address] [facility] [address]  Phone: [phone] Fax: [phone]    Primary Care Provider: Sanna Ambrocio DO    Primary Insurance: [employer]  Secondary Insurance:     PROGRESS NOTE:    CM consulted d/t hx THC use. Baby's UDS negative. No further action needed at this time.

## 2025-01-28 NOTE — PROGRESS NOTES
"Progress Note - OB/GYN   Name: Juli Fish 19 y.o. female I MRN: [id number]  Unit/Bed#: -01 I Date of Admission: 2025   Date of Service: 2025 I Hospital Day: 2     Assessment & Plan   (spontaneous vaginal delivery)  Continue routine post partum care  Encourage ambulation  Encourage breastfeeding  Contraception: Depo  Anticipate discharge  PPD 1 vs 2   Major depressive disorder  Not on medication   F/U EPDS and monitor closely for postpartum depression   Anemia affecting pregnancy in third trimester  S/p oral iron supplementation   Admission Hgb 10.0    Progress Note - OB/GYN   Juli Fish 19 y.o. female MRN: [id number]  Unit/Bed#: -01 Encounter: [id number]    Assessment:  Post partum Day #1 s/p , stable, baby in the room.    Subjective/Objective   Chief Complaint:     Post delivery. Patient is doing well. Lochia WNL. Pain well controlled.     Subjective:     Pain: yes, cramping, improved with meds  Tolerating PO: yes  Voiding: yes  Flatus: yes  Ambulating: yes  Chest pain: no  Shortness of breath: no  Leg pain: no  Lochia: minimal    Objective:     Vitals: /83 (BP Location: Right arm)   Pulse 92   Temp 97.8 °F (36.6 °C) (Oral)   Resp 18   Ht 5' 3\" (1.6 m)   Wt 66.7 kg (147 lb)   LMP 2024 (Approximate)   SpO2 99%   Breastfeeding Yes   BMI 26.04 kg/m²     I/O          0701   0700  0701   0700    Urine (mL/kg/hr)  1850 (1.2)    Blood  34    Total Output  1884    Net  -1884                  Lab Results   Component Value Date    WBC 8.91 2025    HGB 10.0 (L) 2025    HCT 32.6 (L) 2025    MCV 80 (L) 2025     2025       Physical Exam:     Gen: AAOx3, NAD  CV: no acute distress  Lungs: no acute distress  Abd: Soft, non-tender, non-distended, no rebound or guarding  Uterine fundus firm and non-tender, 2 cm below the umbilicus.  Ext: Non tender    Mat Thomas MD  OB GYN PGY1  25  5:49 AM        "

## 2025-01-29 VITALS
OXYGEN SATURATION: 99 % | HEIGHT: 63 IN | DIASTOLIC BLOOD PRESSURE: 88 MMHG | HEART RATE: 71 BPM | SYSTOLIC BLOOD PRESSURE: 127 MMHG | BODY MASS INDEX: 26.05 KG/M2 | WEIGHT: 147 LBS | RESPIRATION RATE: 16 BRPM | TEMPERATURE: 98.2 F

## 2025-01-29 PROCEDURE — 99024 POSTOP FOLLOW-UP VISIT: CPT

## 2025-01-29 PROCEDURE — NC001 PR NO CHARGE

## 2025-01-29 RX ORDER — ACETAMINOPHEN 325 MG/1
650 TABLET ORAL EVERY 4 HOURS PRN
Qty: 90 TABLET | Refills: 0 | Status: SHIPPED | OUTPATIENT
Start: 2025-01-29

## 2025-01-29 RX ORDER — IBUPROFEN 600 MG/1
600 TABLET, FILM COATED ORAL EVERY 6 HOURS
Qty: 30 TABLET | Refills: 0 | Status: SHIPPED | OUTPATIENT
Start: 2025-01-29

## 2025-01-29 RX ORDER — MEDROXYPROGESTERONE ACETATE 150 MG/ML
150 INJECTION, SUSPENSION INTRAMUSCULAR ONCE
Qty: 1 ML | Refills: 0 | Status: SHIPPED | OUTPATIENT
Start: 2025-01-29 | End: 2025-01-29

## 2025-01-29 RX ADMIN — MEDROXYPROGESTERONE ACETATE 150 MG: 150 INJECTION, SUSPENSION, EXTENDED RELEASE INTRAMUSCULAR at 08:09

## 2025-01-29 RX ADMIN — IBUPROFEN 600 MG: 600 TABLET, FILM COATED ORAL at 04:55

## 2025-01-29 RX ADMIN — DOCUSATE SODIUM 100 MG: 100 CAPSULE, LIQUID FILLED ORAL at 08:10

## 2025-01-29 NOTE — PLAN OF CARE
Problem: PAIN - ADULT  Goal: Verbalizes/displays adequate comfort level or baseline comfort level  Description: Interventions:  - Encourage patient to monitor pain and request assistance  - Assess pain using appropriate pain scale  - Administer analgesics based on type and severity of pain and evaluate response  - Implement non-pharmacological measures as appropriate and evaluate response  - Consider cultural and social influences on pain and pain management  - Notify physician/advanced practitioner if interventions unsuccessful or patient reports new pain  Outcome: Progressing     Problem: INFECTION - ADULT  Goal: Absence or prevention of progression during hospitalization  Description: INTERVENTIONS:  - Assess and monitor for signs and symptoms of infection  - Monitor lab/diagnostic results  - Monitor all insertion sites, i.e. indwelling lines, tubes, and drains  - Monitor endotracheal if appropriate and nasal secretions for changes in amount and color  - [place] appropriate cooling/warming therapies per order  - Administer medications as ordered  - Instruct and encourage patient and family to use good hand hygiene technique  - Identify and instruct in appropriate isolation precautions for identified infection/condition  Outcome: Progressing  Goal: Absence of fever/infection during neutropenic period  Description: INTERVENTIONS:  - Monitor WBC    Outcome: Progressing     Problem: SAFETY ADULT  Goal: Patient will remain free of falls  Description: INTERVENTIONS:  - Educate patient/family on patient safety including physical limitations  - Instruct patient to call for assistance with activity   - Consult OT/PT to assist with strengthening/mobility   - Keep Call bell within reach  - Keep bed low and locked with side rails adjusted as appropriate  - Keep care items and personal belongings within reach  - Initiate and maintain comfort rounds  - Apply yellow socks and bracelet for high fall risk patients  - Consider  moving patient to room near nurses station  Outcome: Progressing  Goal: Maintain or return to baseline ADL function  Description: INTERVENTIONS:  -  Assess patient's ability to carry out ADLs; assess patient's baseline for ADL function and identify physical deficits which impact ability to perform ADLs (bathing, care of mouth/teeth, toileting, grooming, dressing, etc.)  - Assess/evaluate cause of self-care deficits   - Assess range of motion  - Assess patient's mobility; develop plan if impaired  - Assess patient's need for assistive devices and provide as appropriate  - Encourage maximum independence but intervene and supervise when necessary  - Involve family in performance of ADLs  - Assess for home care needs following discharge   - Consider OT consult to assist with ADL evaluation and planning for discharge  - Provide patient education as appropriate  Outcome: Progressing  Goal: Maintains/Returns to pre admission functional level  Description: INTERVENTIONS:  - Perform AM-PAC 6 Click Basic Mobility/ Daily Activity assessment daily.  - Set and communicate daily mobility goal to care team and patient/family/caregiver.   - Collaborate with rehabilitation services on mobility goals if consulted  - Out of bed for toileting  - Record patient progress and toleration of activity level   Outcome: Progressing     Problem: Knowledge Deficit  Goal: Patient/family/caregiver demonstrates understanding of disease process, treatment plan, medications, and discharge instructions  Description: Complete learning assessment and assess knowledge base.  Interventions:  - Provide teaching at level of understanding  - Provide teaching via preferred learning methods  Outcome: Progressing     Problem: DISCHARGE PLANNING  Goal: Discharge to home or other facility with appropriate resources  Description: INTERVENTIONS:  - Identify barriers to discharge w/patient and caregiver  - Arrange for needed discharge resources and transportation as  appropriate  - Identify discharge learning needs (meds, wound care, etc.)  - Arrange for interpretive services to assist at discharge as needed  - Refer to Case Management Department for coordinating discharge planning if the patient needs post-hospital services based on physician/advanced practitioner order or complex needs related to functional status, cognitive ability, or social support system  Outcome: Progressing

## 2025-01-29 NOTE — PLAN OF CARE
Problem: PAIN - ADULT  Goal: Verbalizes/displays adequate comfort level or baseline comfort level  Description: Interventions:  - Encourage patient to monitor pain and request assistance  - Assess pain using appropriate pain scale  - Administer analgesics based on type and severity of pain and evaluate response  - Implement non-pharmacological measures as appropriate and evaluate response  - Consider cultural and social influences on pain and pain management  - Notify physician/advanced practitioner if interventions unsuccessful or patient reports new pain  Outcome: Adequate for Discharge     Problem: INFECTION - ADULT  Goal: Absence or prevention of progression during hospitalization  Description: INTERVENTIONS:  - Assess and monitor for signs and symptoms of infection  - Monitor lab/diagnostic results  - Monitor all insertion sites, i.e. indwelling lines, tubes, and drains  - Monitor endotracheal if appropriate and nasal secretions for changes in amount and color  - [place] appropriate cooling/warming therapies per order  - Administer medications as ordered  - Instruct and encourage patient and family to use good hand hygiene technique  - Identify and instruct in appropriate isolation precautions for identified infection/condition  Outcome: Adequate for Discharge  Goal: Absence of fever/infection during neutropenic period  Description: INTERVENTIONS:  - Monitor WBC    Outcome: Adequate for Discharge     Problem: SAFETY ADULT  Goal: Patient will remain free of falls  Description: INTERVENTIONS:  - Educate patient/family on patient safety including physical limitations  - Instruct patient to call for assistance with activity   - Consult OT/PT to assist with strengthening/mobility   - Keep Call bell within reach  - Keep bed low and locked with side rails adjusted as appropriate  - Keep care items and personal belongings within reach  - Initiate and maintain comfort rounds  - Make Fall Risk Sign visible to  staff  - Apply yellow socks and bracelet for high fall risk patients  - Consider moving patient to room near nurses station  Outcome: Adequate for Discharge  Goal: Maintain or return to baseline ADL function  Description: INTERVENTIONS:  -  Assess patient's ability to carry out ADLs; assess patient's baseline for ADL function and identify physical deficits which impact ability to perform ADLs (bathing, care of mouth/teeth, toileting, grooming, dressing, etc.)  - Assess/evaluate cause of self-care deficits   - Assess range of motion  - Assess patient's mobility; develop plan if impaired  - Assess patient's need for assistive devices and provide as appropriate  - Encourage maximum independence but intervene and supervise when necessary  - Involve family in performance of ADLs  - Assess for home care needs following discharge   - Consider OT consult to assist with ADL evaluation and planning for discharge  - Provide patient education as appropriate  Outcome: Adequate for Discharge  Goal: Maintains/Returns to pre admission functional level  Description: INTERVENTIONS:  - Perform AM-PAC 6 Click Basic Mobility/ Daily Activity assessment daily.  - Set and communicate daily mobility goal to care team and patient/family/caregiver.   - Collaborate with rehabilitation services on mobility goals if consulted  - Out of bed for toileting  - Record patient progress and toleration of activity level   Outcome: Adequate for Discharge     Problem: Knowledge Deficit  Goal: Patient/family/caregiver demonstrates understanding of disease process, treatment plan, medications, and discharge instructions  Description: Complete learning assessment and assess knowledge base.  Interventions:  - Provide teaching at level of understanding  - Provide teaching via preferred learning methods  Outcome: Adequate for Discharge     Problem: DISCHARGE PLANNING  Goal: Discharge to home or other facility with appropriate resources  Description:  INTERVENTIONS:  - Identify barriers to discharge w/patient and caregiver  - Arrange for needed discharge resources and transportation as appropriate  - Identify discharge learning needs (meds, wound care, etc.)  - Arrange for interpretive services to assist at discharge as needed  - Refer to Case Management Department for coordinating discharge planning if the patient needs post-hospital services based on physician/advanced practitioner order or complex needs related to functional status, cognitive ability, or social support system  Outcome: Adequate for Discharge

## 2025-01-29 NOTE — DISCHARGE SUMMARY
Discharge Summary - OB/GYN   Name: Juli Fish 19 y.o. female I MRN: [id number]  Unit/Bed#: -01 I Date of Admission: 2025   Date of Service: 2025 I Hospital Day: 3    ADMISSION  Patient of: [facility]  Admission Date: 2025   Admitting Attending: Dr. 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  Discharge Diagnosis:   Same, Delivered    Clinical course: Admission to Delivery  Juli Fish is a 19 y.o.  who was admitted at 39w1d for elective IOL.    Reason for induction: Elective.     Pt was in spontaneous labor and managed expectantly.    Induction method:  , ,Misoprostol;Ruiz/EASI Elective.     For pain control in labor, pt received epidual.  The labor course was uncomplicated. She progressed to complete and began pushing.    Delivery  Route of Delivery: Vaginal, Spontaneous      Anesthesia: Epidural,   QBL: Non-Surgical QBL (mL): 34          Delivery: Vaginal, Spontaneous at 2025 3:42 PM  Laceration: Perineal: None Repaired?      Baby's Weight: 3030 g (6 lb 10.9 oz); 106.88    Apgar scores: 8  and 9  at 1 and 5 minutes,  respectively    Clinical Course: Post-Delivery:  The post delivery course was unremarkable.    On the day of discharge, the patient was ambulating, voiding spontaneously, tolerating oral intake, and hemodynamically stable. She was able to reasonably perform all ADLs. She had appropriate bowel function. Pain was well-controlled. She was discharged home on postpartum/postop day #2 without complications. Patient was instructed to follow up with her OB as an outpatient and was given appropriate warnings to call her provider with problems or concerns.    Pertinent lab findings included:   Blood type O+.     Last three Hgb values:  Lab Results   Component Value Date    HGB 10.0 (L) 2025    HGB 11.2 (L) 11/15/2024    HGB 11.5 2024        Problem-specific follow-up plans included the following:  Assessment & Plan   (spontaneous vaginal delivery)  Routine postpartum care  Encourage ambulation  Encourage familial bonding  Lactation support as needed  Pain: Motrin/Tylenol  if needed  Postpartum Contraceptive plan: Depo prior to DC  Pre-delivery Hgb 10   Void: passed  DVT Ppx: ambulation    Major depressive disorder  Not on medication   F/U EPDS and monitor closely for postpartum depression   Anemia affecting pregnancy in third trimester  S/p oral iron supplementation   Admission Hgb 10.0  Cystic 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, 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  Partner did not complete testing  Encounter for initial prescription of injectable contraceptive      Discharge med list:  Contraception: Depo Provera     Medication List      ASK your doctor about these medications     docusate sodium 100 mg capsule; Commonly known as: COLACE; Take 1   capsule (100 mg total) by mouth 2 (two) times a day   ferrous sulfate 324 (65 Fe) mg; Take 1 tablet (324 mg total) by mouth   every other day with breakfast   hydrocortisone 2.5 % rectal cream; Commonly known as: ANUSOL-HC; Apply   topically 2 (two) times a day    prenatal vitamin 28-0.8 mg; Take 1 tablet by mouth in the morning       Condition at discharge:   good     Disposition:   Home    Planned Readmission:   No    Discharge Statement:  I have spent a total time of 30 minutes in caring for this patient on the day of the visit/encounter. >30 minutes of time was spent on: Diagnostic results, Prognosis, Risks and benefits of tx options, Instructions for management, and Patient and family education.

## 2025-01-29 NOTE — ASSESSMENT & PLAN NOTE
Routine postpartum care  Encourage ambulation  Encourage familial bonding  Lactation support as needed  Pain: Motrin/Tylenol  if needed  Postpartum Contraceptive plan: Depo prior to DC  Pre-delivery Hgb 10   Void: passed  DVT Ppx: ambulation

## 2025-01-29 NOTE — PROGRESS NOTES
"Progress Note - OB/GYN  Juli Fish 19 y.o. female MRN: [id number]  Unit/Bed#:  412-01 Encounter: [id number]    Assessment and Plan     Juli Fish is a patient of: [facility]. 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 She has no current complaints.  Pain is well controlled. She is recovering well and is stable.     Breastfeeding:  yes and formula feeding    Tolerating PO: yes  Nausea or Vomiting: no  Voiding: yes  Flatus: yes; has had no bowel movement.   Ambulating: yes  Chest pain: no  Shortness of breath: no  Leg pain: no  Lochia: appropriate     Vitals:   /78 (BP Location: Right arm)   Pulse 83   Temp 97.6 °F (36.4 °C) (Temporal)   Resp 16   Ht 5' 3\" (1.6 m)   Wt 66.7 kg (147 lb)   LMP 2024 (Approximate)   SpO2 99%   Breastfeeding Yes   BMI 26.04 kg/m²     No intake or output data in the 24 hours ending 25 0641    Invasive Devices       None                   Physical Exam:   GEN: Juli Fish appears well, alert and oriented x 3, pleasant and cooperative   CARDIO: RRR, no murmurs or rubs  RESP:  CTAB, no wheezes or rales  ABDOMEN: soft, no tenderness, no distention, fundus @ " U  EXTREMITIES: non tender, no erythema, b/l Daron's sign negative      Labs:     Hemoglobin   Date Value Ref Range Status   01/26/2025 10.0 (L) 11.5 - 15.4 g/dL Final   11/15/2024 11.2 (L) 11.5 - 15.4 g/dL Final     WBC   Date Value Ref Range Status   01/26/2025 8.91 4.31 - 10.16 Thousand/uL Final   11/15/2024 9.00 4.31 - 10.16 Thousand/uL Final     Platelets   Date Value Ref Range Status   01/26/2025 225 149 - 390 Thousands/uL Final   11/15/2024 194 149 - 390 Thousands/uL Final     Creatinine   Date Value Ref Range Status   09/27/2023 0.67 0.60 - 1.30 mg/dL Final     Comment:     Standardized to IDMS reference method   07/15/2022 0.66 0.60 - 1.30 mg/dL Final     Comment:     Standardized to IDMS reference method     AST   Date Value Ref Range Status   09/27/2023 16 13 - 39 U/L Final   07/15/2022 18 5 - 45 U/L Final     Comment:     Specimen collection should occur prior to Sulfasalazine administration due to the potential for falsely depressed results.      ALT   Date Value Ref Range Status   09/27/2023 10 7 - 52 U/L Final     Comment:     Specimen collection should occur prior to Sulfasalazine administration due to the potential for falsely depressed results.    07/15/2022 24 12 - 78 U/L Final     Comment:     Specimen collection should occur prior to Sulfasalazine and/or Sulfapyridine administration due to the potential for falsely depressed results.           OLGA Espinoza  1/29/2025  6:41 AM

## 2025-01-29 NOTE — UTILIZATION REVIEW
"Mother and baby discharged on 2025         NOTIFICATION OF INPATIENT ADMISSION   MATERNITY/DELIVERY AUTHORIZATION REQUEST   SERVICING FACILITY:   [facility] - L&D, , NICU  [address] [place] [address]  Tax ID: [id number]  NPI: [id number] ATTENDING PROVIDER:  Attending Name and NPI#: Danish Pepe Md [[id number]]  Address: [address] [place] [address]  Phone: [phone]     ADMISSION INFORMATION:  Place of Service: Inpatient [facility]  Place of Service Code: 21  Inpatient Admission Date/Time: 25  8:13 PM  Discharge Date/Time: 2025 11:39 AM  Admitting Diagnosis Code/Description:  Encounter for induction of labor [Z34.90]   Mother: Juli Fish 2005 Estimated Date of Delivery: 25  Delivering clinician: Danish Pepe   OB History          1    Para   1    Term   1       0    AB   0    Living   1         SAB   0    IAB   0    Ectopic   0    Multiple   0    Live Births   1               [place] Name & MRN:   Information for the patient's :  Abe Baby Boy (Juli) [[id number]]    Delivery Information:  Sex: male  Delivered 2025 3:42 PM by Vaginal, Spontaneous; Gestational Age: 39w1d     Measurements:  Weight: 6 lb 10.9 oz (3030 g);  Height: 19\"    APGAR 1 minute 5 minutes 10 minutes   Totals: 8 9       UTILIZATION REVIEW CONTACT:  Lin Bojorquez, Utilization   Network Utilization Review Department  Phone: [phone]  Fax [phone]  Email: [email or web address]  Contact for approvals/pending authorizations, clinical reviews, and discharge.   PHYSICIAN ADVISORY SERVICES:  Medical Necessity Denial & Qeuc-ck-Ryru Review  Phone: [phone]  Fax: [phone]  Email: [email or web address]   DISCHARGE SUPPORT TEAM:  For Patients Discharge Needs & Updates  Phone: [phone] opt. 2 Fax: [phone]  Email: " [email or web address]

## 2025-01-29 NOTE — NURSING NOTE
Discharge teaching performed with patient; educational packet provided and reviewed, as well as the save your life magnet. Patient verbalized an understanding and was encouraged to continue to ask questions prior to discharge.

## 2025-01-30 ENCOUNTER — TELEPHONE (OUTPATIENT)
Dept: OBGYN CLINIC | Facility: CLINIC | Age: 20
End: 2025-01-30

## 2025-02-04 LAB — PLACENTA IN STORAGE: NORMAL

## 2025-02-19 ENCOUNTER — POSTPARTUM VISIT (OUTPATIENT)
Dept: OBGYN CLINIC | Facility: CLINIC | Age: 20
End: 2025-02-19

## 2025-02-19 VITALS — SYSTOLIC BLOOD PRESSURE: 118 MMHG | DIASTOLIC BLOOD PRESSURE: 76 MMHG | BODY MASS INDEX: 22.6 KG/M2 | WEIGHT: 127.6 LBS

## 2025-02-19 PROCEDURE — 99213 OFFICE O/P EST LOW 20 MIN: CPT | Performed by: OBSTETRICS & GYNECOLOGY

## 2025-02-19 NOTE — PROGRESS NOTES
Subjective     Juli Fish is a 19 y.o. y.o. female  who presents for a postpartum visit. She is 3 weeks postpartum following a spontaneous vaginal delivery on 25. I have fully reviewed the prenatal and intrapartum course. The delivery was at 39 gestational weeks. Outcome: spontaneous vaginal delivery. Anesthesia: epidural. Postpartum course has been uncomplicated. Baby's course has been uncomplicated. Baby is feeding by bottle -  Similac 360 . Bleeding thin lochia. Bowel function is normal. Bladder function is normal. Patient is not sexually active. Contraception method is Depo-Provera injections. Postpartum depression screening: negative (3).    The following portions of the patient's history were reviewed and updated as appropriate: allergies, current medications, past family history, past medical history, past social history, past surgical history, and problem list.    Review of Systems  Pertinent items are noted in HPI..    Objective   Menstrual History:  OB History          1    Para   1    Term   1       0    AB   0    Living   1         SAB   0    IAB   0    Ectopic   0    Multiple   0    Live Births   1                  Patient's last menstrual period was 2024 (approximate).            Vitals:    25 1417   BP: 118/76       Physical Exam  Constitutional:       Appearance: She is well-developed.   Cardiovascular:      Rate and Rhythm: Normal rate and regular rhythm.      Heart sounds: Normal heart sounds. No murmur heard.     No friction rub. No gallop.   Pulmonary:      Effort: Pulmonary effort is normal. No respiratory distress.      Breath sounds: No wheezing.   Abdominal:      Palpations: Abdomen is soft.      Tenderness: There is no abdominal tenderness.   Musculoskeletal:         General: No tenderness.   Neurological:      Mental Status: She is alert and oriented to person, place, and time.   Vitals reviewed.         Assessment/Plan     Normal postpartum  exam.     Problem List Items Addressed This Visit        (spontaneous vaginal delivery)     Other Visit Diagnoses         Postpartum state    -  Primary    Normal postpartum exam

## 2025-03-12 ENCOUNTER — OFFICE VISIT (OUTPATIENT)
Age: 20
End: 2025-03-12
Payer: COMMERCIAL

## 2025-03-12 VITALS — HEIGHT: 63 IN | WEIGHT: 127 LBS | BODY MASS INDEX: 22.5 KG/M2

## 2025-03-12 DIAGNOSIS — M79.18 MYOFASCIAL PAIN: ICD-10-CM

## 2025-03-12 DIAGNOSIS — M54.6 BILATERAL THORACIC BACK PAIN, UNSPECIFIED CHRONICITY: Primary | ICD-10-CM

## 2025-03-12 PROCEDURE — 99203 OFFICE O/P NEW LOW 30 MIN: CPT | Performed by: CHIROPRACTOR

## 2025-03-12 NOTE — PROGRESS NOTES
Assessment/Plan:    1. Bilateral thoracic back pain, unspecified chronicity        2. Myofascial pain            Review of Diagnostic Studies and Office Notes:    The patient's spinal x-rays from were reviewed prior to the chiropractic evaluation today.    Study Result - 1/24/20    Narrative & Impression   SCOLIOSIS      INDICATION:   M43.9: Deforming dorsopathy, unspecified.     COMPARISON:  None.     VIEWS:  XR ENTIRE SPINE (SCOLIOSIS) 2-3 VW         FINDINGS:     There is thoracolumbar spinal asymmetry (Franco angle less than 10 degrees).  No coronal imbalance.  No sagittal imbalance.     There are 12 rib-bearing thoracic-type vertebrae and 5 non-rib-bearing lumbar-type vertebrae with no evidence of a vertebral anomaly.  Vertebral body heights and intervertebral disc heights are maintained.  No spondylolisthesis or evidence of   spondylolysis.     No pelvic obliquity.  Risser stage 5.  Hip joint alignments are anatomic.       IMPRESSION:     Thoracolumbar spinal asymmetry without truncal imbalance or pelvic obliquity.            Decision and Treatment Plan:    I reviewed my findings with the patient today.  Patient was interested in receiving chiropractic care and was treated today.  We will treat the patient 1x/week for the next three weeks and re-evaluate. If there is improvement in symptoms and objective findings, frequency will be reduced.       The patient will be treated with conservative chiropractic care including myofascial release, joint mobilization, and a home stretching and icing program.    The patient was taught a stretch today. The patient was advised to do the stretch for 3 sets of 15-20 seconds several times per day.The need to stretch to the point of tension only was discussed.       Patient Instructions:    Return for treatment once next week.     6 was rendered today per insurance company rules.      Time/Reviewed with Patient:    Time:  At least 33 minutes of time was spent with the patient  during the consultation including the patient's history/current complaints, physical exam, reviewing the diagnostic report, reviewing home instruction/reducing risk factors with the patient, reviewing my findings with the patient, and discussing the treatment with the patient.     Treatment today consisted of myofascial release to the thoracic paraspinals, middle trapezius, rhomboids, levator scapula.    Review of Systems   Constitutional:  Negative for chills, fever and unexpected weight change.   Gastrointestinal:  Negative for constipation and diarrhea.   Genitourinary:  Negative for difficulty urinating and urgency.     Subjective:    Juli Fish is a 19 y.o. female presents today for chiropractic evaluation and possible treatment.  She states she came here on her own.  She has chief complaint of pain in the right posterior shoulder and upper back region.  She also complains of pain in the mid to lower back.  She states she gave birth to her child about 6 to 7 weeks ago.  She states everything went well with the delivery.  She did have some pain in her mid to lower back prior to delivering the baby but it seemed to get a little worse after the epidural and now that she has a baby at home that she is caring for.  She states that it was never treated.  She never had any evaluation of her back other than when she was a teenager she had x-rays for evaluation of scoliosis.  She was deemed to have approximately 10 degree curvature.  She has been having pain in the right upper back posterior shoulder since delivering the baby.  She thinks it is related to carrying the baby.  She denies radiation of pain down the arms or legs, numbness or tingling in the upper or lower extremities.  She describes the pain as achy tight stiff in the back and posterior shoulder region.     She denies fever, chills, night sweats, recent unexplained weight loss, changes in bowel/bladder habits, urinary incontinence or  "retention.    Objective:     Ht 5' 3\" (1.6 m)   Wt 57.6 kg (127 lb)   LMP 04/28/2024 (Approximate)   BMI 22.50 kg/m²     Appearance: Well developed, well nourished, and in no acute distress    Alert and oriented x 3    Mood/Affect: calm and pleasant    Gait/Posture:    Gait: Normal    Antalgic posture: None     Posture: In a seated position she is noted to have rounding of the shoulders and anterior head carriage with an increase in the mid to upper thoracic kyphosis.    Lordosis: Cervical- decreased    Lordosis: Lumbar- normal    Kyphosis: Thoracic- increased    Upper extremity reflexes:    Deep tendon reflexes were normal and symmetrical in the upper extremities.    Upper Extremity Muscle Testing:    Muscle testing of the bilateral upper extremities was normal and graded +5/5.    Gomez's was negative bilaterally.    Cervical Orthopedic Tests:    Maximal foraminal Compression on the Right: negative .    Maximal foraminal Compression on the Left: negative .      Active Cervical Ranges of Motion:    Cervical range of motion is full and pain-free.    Thoracic range of motion is full and pain-free with the exception of some pain/pulling on the right side with left lateral bending.    Right shoulder range of motion is full and pain-free at the glenohumeral joint.  Palpation:    Moderate spasm and tenderness is noted in the mid thoracic and thoracolumbar paraspinals as well as the right middle trapezius and rhomboids.  Trigger points are noted in the rhomboids, middle trapezius more so on the right.  Trigger points are also noted in the right infraspinatus and teres musculature.    Joint dysfunction is present at T3-4, T5-6, T9-10, L2-3.      Dictation voice to text software has been used in the creation of this document. Please consider this in light of any contextual or grammatical errors.  "

## 2025-04-17 ENCOUNTER — TELEPHONE (OUTPATIENT)
Dept: OBGYN CLINIC | Facility: CLINIC | Age: 20
End: 2025-04-17

## 2025-04-17 ENCOUNTER — CLINICAL SUPPORT (OUTPATIENT)
Dept: OBGYN CLINIC | Facility: CLINIC | Age: 20
End: 2025-04-17

## 2025-04-17 VITALS
HEIGHT: 63 IN | WEIGHT: 138 LBS | BODY MASS INDEX: 24.45 KG/M2 | SYSTOLIC BLOOD PRESSURE: 100 MMHG | DIASTOLIC BLOOD PRESSURE: 60 MMHG

## 2025-04-17 DIAGNOSIS — Z30.42 ENCOUNTER FOR SURVEILLANCE OF INJECTABLE CONTRACEPTIVE: Primary | ICD-10-CM

## 2025-04-17 DIAGNOSIS — Z30.09 UNWANTED FERTILITY: ICD-10-CM

## 2025-04-17 LAB — SL AMB POCT URINE HCG: NEGATIVE

## 2025-04-17 PROCEDURE — 81025 URINE PREGNANCY TEST: CPT | Performed by: OBSTETRICS & GYNECOLOGY

## 2025-04-17 PROCEDURE — 96372 THER/PROPH/DIAG INJ SC/IM: CPT | Performed by: OBSTETRICS & GYNECOLOGY

## 2025-04-17 RX ORDER — MEDROXYPROGESTERONE ACETATE 150 MG/ML
150 INJECTION, SUSPENSION INTRAMUSCULAR ONCE
Status: COMPLETED | OUTPATIENT
Start: 2025-04-17 | End: 2025-04-17

## 2025-04-17 RX ORDER — MEDROXYPROGESTERONE ACETATE 150 MG/ML
150 INJECTION, SUSPENSION INTRAMUSCULAR ONCE
Qty: 1 ML | Refills: 0 | Status: SHIPPED | OUTPATIENT
Start: 2025-04-17 | End: 2025-04-17

## 2025-04-17 RX ADMIN — MEDROXYPROGESTERONE ACETATE 150 MG: 150 INJECTION, SUSPENSION INTRAMUSCULAR at 15:57

## 2025-04-17 NOTE — TELEPHONE ENCOUNTER
Voicemail:Demetrius romero is Idalmis Fish calling. My [birth year] is [birth year] I have an appointment today at 2 and for my double and I don't have my prescription refilled. I was wondering if you guys could do that before my appointment. My number is [phone]. Thank you.    Patient has appt today and requesting medroxyPROGESTERone to be sent to [address]

## 2025-04-17 NOTE — PROGRESS NOTES
Depo given to patient in right arm on 4/17/2025. Pregnancy test was negative.    NDC: 16084-886-97  LOT: [id number]  EXP: 04/30/2027

## 2025-07-03 ENCOUNTER — TELEPHONE (OUTPATIENT)
Dept: OBGYN CLINIC | Facility: CLINIC | Age: 20
End: 2025-07-03

## 2025-07-03 DIAGNOSIS — Z30.09 UNWANTED FERTILITY: Primary | ICD-10-CM

## 2025-07-03 RX ORDER — MEDROXYPROGESTERONE ACETATE 150 MG/ML
150 INJECTION, SUSPENSION INTRAMUSCULAR ONCE
Qty: 1 ML | Refills: 3 | Status: SHIPPED | OUTPATIENT
Start: 2025-07-03 | End: 2025-07-03

## 2025-07-07 ENCOUNTER — CLINICAL SUPPORT (OUTPATIENT)
Dept: OBGYN CLINIC | Facility: CLINIC | Age: 20
End: 2025-07-07

## 2025-07-07 VITALS — SYSTOLIC BLOOD PRESSURE: 130 MMHG | DIASTOLIC BLOOD PRESSURE: 80 MMHG | WEIGHT: 155 LBS | BODY MASS INDEX: 27.46 KG/M2

## 2025-07-07 DIAGNOSIS — Z30.09 UNWANTED FERTILITY: Primary | ICD-10-CM

## 2025-07-07 PROCEDURE — 96372 THER/PROPH/DIAG INJ SC/IM: CPT

## 2025-07-07 RX ORDER — MEDROXYPROGESTERONE ACETATE 150 MG/ML
150 INJECTION, SUSPENSION INTRAMUSCULAR
Status: SHIPPED | OUTPATIENT
Start: 2025-07-07

## 2025-07-07 RX ADMIN — MEDROXYPROGESTERONE ACETATE 150 MG: 150 INJECTION, SUSPENSION INTRAMUSCULAR at 17:21

## 2025-08-21 ENCOUNTER — OFFICE VISIT (OUTPATIENT)
Dept: FAMILY MEDICINE CLINIC | Facility: CLINIC | Age: 20
End: 2025-08-21

## 2025-08-21 VITALS
HEIGHT: 63 IN | HEART RATE: 82 BPM | RESPIRATION RATE: 16 BRPM | TEMPERATURE: 98 F | BODY MASS INDEX: 28.7 KG/M2 | WEIGHT: 162 LBS | SYSTOLIC BLOOD PRESSURE: 126 MMHG | OXYGEN SATURATION: 98 % | DIASTOLIC BLOOD PRESSURE: 70 MMHG

## 2025-08-21 DIAGNOSIS — Z00.00 ANNUAL PHYSICAL EXAM: Primary | ICD-10-CM

## 2025-08-21 DIAGNOSIS — Z02.4 DRIVER'S PERMIT PHYSICAL EXAMINATION: ICD-10-CM

## 2025-08-21 PROBLEM — R10.2 PELVIC PAIN AFFECTING PREGNANCY IN THIRD TRIMESTER, ANTEPARTUM: Status: RESOLVED | Noted: 2025-01-02 | Resolved: 2025-08-21

## 2025-08-21 PROBLEM — Z11.4 SCREENING FOR HIV (HUMAN IMMUNODEFICIENCY VIRUS): Status: RESOLVED | Noted: 2023-09-06 | Resolved: 2025-08-21

## 2025-08-21 PROBLEM — Z34.90 ENCOUNTER FOR ELECTIVE INDUCTION OF LABOR: Status: RESOLVED | Noted: 2025-01-26 | Resolved: 2025-08-21

## 2025-08-21 PROBLEM — B95.1 GROUP BETA STREP POSITIVE: Status: RESOLVED | Noted: 2025-01-09 | Resolved: 2025-08-21

## 2025-08-21 PROBLEM — Z91.81 STATUS POST FALL: Status: RESOLVED | Noted: 2024-12-09 | Resolved: 2025-08-21

## 2025-08-21 PROBLEM — F32.9 MAJOR DEPRESSIVE DISORDER: Status: RESOLVED | Noted: 2023-10-04 | Resolved: 2025-08-21

## 2025-08-21 PROBLEM — O26.893 PELVIC PAIN AFFECTING PREGNANCY IN THIRD TRIMESTER, ANTEPARTUM: Status: RESOLVED | Noted: 2025-01-02 | Resolved: 2025-08-21

## 2025-08-21 PROBLEM — Z30.013 ENCOUNTER FOR INITIAL PRESCRIPTION OF INJECTABLE CONTRACEPTIVE: Status: RESOLVED | Noted: 2025-01-28 | Resolved: 2025-08-21

## 2025-08-21 PROBLEM — Z86.59 HISTORY OF SUICIDAL IDEATION: Status: RESOLVED | Noted: 2023-10-04 | Resolved: 2025-08-21

## 2025-08-21 PROBLEM — Z20.2 POSSIBLE EXPOSURE TO STD: Status: RESOLVED | Noted: 2024-07-02 | Resolved: 2025-08-21

## 2025-08-21 PROBLEM — R63.6 UNDERWEIGHT: Status: RESOLVED | Noted: 2023-09-06 | Resolved: 2025-08-21

## 2025-08-21 PROBLEM — O99.013 ANEMIA AFFECTING PREGNANCY IN THIRD TRIMESTER: Status: RESOLVED | Noted: 2024-11-21 | Resolved: 2025-08-21

## 2025-08-21 PROCEDURE — 99385 PREV VISIT NEW AGE 18-39: CPT
